# Patient Record
Sex: MALE | Race: WHITE | HISPANIC OR LATINO | Employment: FULL TIME | URBAN - METROPOLITAN AREA
[De-identification: names, ages, dates, MRNs, and addresses within clinical notes are randomized per-mention and may not be internally consistent; named-entity substitution may affect disease eponyms.]

---

## 2019-02-08 ENCOUNTER — OFFICE VISIT (OUTPATIENT)
Dept: FAMILY MEDICINE CLINIC | Facility: CLINIC | Age: 44
End: 2019-02-08

## 2019-02-08 VITALS
SYSTOLIC BLOOD PRESSURE: 108 MMHG | HEART RATE: 55 BPM | WEIGHT: 195 LBS | TEMPERATURE: 98.2 F | DIASTOLIC BLOOD PRESSURE: 72 MMHG | HEIGHT: 68 IN | OXYGEN SATURATION: 98 % | BODY MASS INDEX: 29.55 KG/M2

## 2019-02-08 DIAGNOSIS — E78.49 OTHER HYPERLIPIDEMIA: ICD-10-CM

## 2019-02-08 DIAGNOSIS — Z13.6 SCREENING FOR CARDIOVASCULAR CONDITION: ICD-10-CM

## 2019-02-08 DIAGNOSIS — F51.01 PRIMARY INSOMNIA: ICD-10-CM

## 2019-02-08 DIAGNOSIS — Z00.00 HEALTH MAINTENANCE EXAMINATION: Primary | ICD-10-CM

## 2019-02-08 DIAGNOSIS — E55.9 HYPOVITAMINOSIS D: ICD-10-CM

## 2019-02-08 PROBLEM — L71.9 ROSACEA: Status: ACTIVE | Noted: 2019-02-08

## 2019-02-08 PROCEDURE — 99396 PREV VISIT EST AGE 40-64: CPT | Performed by: FAMILY MEDICINE

## 2019-02-08 PROCEDURE — 99214 OFFICE O/P EST MOD 30 MIN: CPT | Performed by: FAMILY MEDICINE

## 2019-02-08 RX ORDER — ITRACONAZOLE 100 MG/1
200 CAPSULE ORAL WEEKLY
COMMUNITY
End: 2020-11-16

## 2019-02-08 RX ORDER — DOXEPIN HYDROCHLORIDE 6 MG/1
1 TABLET ORAL NIGHTLY
Qty: 8 TABLET | Refills: 0 | COMMUNITY
Start: 2019-02-08 | End: 2019-02-18 | Stop reason: SDUPTHER

## 2019-02-08 NOTE — PROGRESS NOTES
Everton Barros is a 43 y.o. male.     Chief Complaint   Patient presents with   • Annual Exam     physical exam complain about stress lately    • Establish Care     new pt establishing today with dr gillespie       HPI     Pt is a pleasant 43 y.o. YO male here for Annual wellness and Insomnia.  New patient to me and this office.  PMH includes GERD.    Using tacrolimus on face for rosacea     Health Maintenance   Topic Date Due   • ANNUAL PHYSICAL  06/22/1979   • LIPID PANEL  02/08/2019   • TDAP/TD VACCINES (1 - Tdap) 02/09/2020 (Originally 6/22/1995)   • INFLUENZA VACCINE  Completed     Diet: eating healthier, gained 10 lbs in the last vacation and working to loose it.   Exercising twice a week.     Insomnia, worsening.  He does have some issues with jet lag with his work.  But overall he is having issues with early wakening.  He is having more anxiety with work, increased stress load, he often falls asleep easily but then wakes up early and has difficulty falling back asleep.  No gasping for breath or apneic events.  When he does sleep an entire night he does feel like he gets restorative sleep.  No history of obstructive sleep apnea.    The following portions of the patient's history were reviewed and updated as appropriate: allergies, current medications, past family history, past medical history, past social history, past surgical history and problem list.    Review of Systems   Constitutional: Negative.    HENT: Negative.    Eyes: Negative.    Respiratory: Negative.    Cardiovascular: Negative.  Negative for chest pain, palpitations and leg swelling.   Gastrointestinal: Negative.    Endocrine: Negative.    Genitourinary: Negative.    Musculoskeletal: Negative for arthralgias (knees).   Skin: Positive for rash (face).   Allergic/Immunologic: Negative.    Neurological: Negative.  Negative for dizziness, tremors, seizures, syncope, facial asymmetry, speech difficulty, weakness, light-headedness, numbness  and headaches.   Psychiatric/Behavioral: Positive for sleep disturbance.        Stress       Objective  Vitals:    02/08/19 1030   BP: 108/72   Pulse: 55   Temp: 98.2 °F (36.8 °C)   SpO2: 98%        Physical Exam   Constitutional: He is oriented to person, place, and time. He appears well-developed and well-nourished. No distress.   HENT:   Head: Normocephalic.   Nose: Nose normal.   Eyes: EOM are normal.   Neck: Normal range of motion. No thyromegaly present.   Cardiovascular: Normal rate, regular rhythm, normal heart sounds and intact distal pulses.   No murmur heard.  Pulmonary/Chest: Effort normal and breath sounds normal. No respiratory distress.   Musculoskeletal: Normal range of motion.   Neurological: He is alert and oriented to person, place, and time.   Skin: Skin is warm and dry. No rash noted.   Psychiatric: He has a normal mood and affect. His behavior is normal. Judgment and thought content normal.   Nursing note and vitals reviewed.        Current Outpatient Medications:   •  itraconazole (SPORANOX) 100 MG capsule, Take 200 mg by mouth 1 (One) Time Per Week., Disp: , Rfl:   •  Doxepin HCl (SILENOR) 6 MG tablet, Take 1 tablet by mouth Every Night., Disp: 8 tablet, Rfl: 0    Procedures    Lab Results (most recent)     None              FelixConde was seen today for annual exam and establish care.    Diagnoses and all orders for this visit:    Health maintenance examination    Other hyperlipidemia  -     Comprehensive Metabolic Panel  -     Lipid Panel    Screening for cardiovascular condition  -     Comprehensive Metabolic Panel    Hypovitaminosis D  -     Vitamin D 25 Hydroxy    Primary insomnia  -     Doxepin HCl (SILENOR) 6 MG tablet; Take 1 tablet by mouth Every Night.      Mary 43-year-old male here as a new patient.  Annual wellness visit today.  Continue with diet and exercise, immunizations are up-to-date.  He immig[place] to [place] ago from [place], tetanus is up-to-date, unsure of the  exact year.  Labs as above.     Insomnia that is worsening.  Issues with early waking, sample for silenor are given.  If it does seem to help okay to call for long term prescription.    Return if symptoms worsen or fail to improve.      Julissa Buck MD

## 2019-02-16 LAB
25(OH)D3+25(OH)D2 SERPL-MCNC: 15.7 NG/ML (ref 30–100)
ALBUMIN SERPL-MCNC: 4.7 G/DL (ref 3.5–5.5)
ALBUMIN/GLOB SERPL: 1.7 {RATIO} (ref 1.2–2.2)
ALP SERPL-CCNC: 86 IU/L (ref 39–117)
ALT SERPL-CCNC: 29 IU/L (ref 0–44)
AST SERPL-CCNC: 24 IU/L (ref 0–40)
BILIRUB SERPL-MCNC: 0.6 MG/DL (ref 0–1.2)
BUN SERPL-MCNC: 14 MG/DL (ref 6–24)
BUN/CREAT SERPL: 18 (ref 9–20)
CALCIUM SERPL-MCNC: 9.4 MG/DL (ref 8.7–10.2)
CHLORIDE SERPL-SCNC: 106 MMOL/L (ref 96–106)
CHOLEST SERPL-MCNC: 161 MG/DL (ref 100–199)
CO2 SERPL-SCNC: 21 MMOL/L (ref 20–29)
CREAT SERPL-MCNC: 0.8 MG/DL (ref 0.76–1.27)
GLOBULIN SER CALC-MCNC: 2.7 G/DL (ref 1.5–4.5)
GLUCOSE SERPL-MCNC: 100 MG/DL (ref 65–99)
HDLC SERPL-MCNC: 37 MG/DL
LDLC SERPL CALC-MCNC: 85 MG/DL (ref 0–99)
POTASSIUM SERPL-SCNC: 4.1 MMOL/L (ref 3.5–5.2)
PROT SERPL-MCNC: 7.4 G/DL (ref 6–8.5)
SODIUM SERPL-SCNC: 142 MMOL/L (ref 134–144)
TRIGL SERPL-MCNC: 194 MG/DL (ref 0–149)
VLDLC SERPL CALC-MCNC: 39 MG/DL (ref 5–40)

## 2019-02-18 DIAGNOSIS — F51.01 PRIMARY INSOMNIA: ICD-10-CM

## 2019-02-18 RX ORDER — DOXEPIN HYDROCHLORIDE 6 MG/1
1 TABLET ORAL NIGHTLY
Qty: 30 TABLET | Refills: 11 | Status: SHIPPED | OUTPATIENT
Start: 2019-02-18 | End: 2020-11-16

## 2019-02-18 NOTE — PROGRESS NOTES
Please call patient with results. Kidney and liver functions look normal. Diabetes screen is negative but glucose is slightly elevated. Cholesterol is slightly elevated but with other risk factors only a 1.1% ASCVD risk and would not recommend Statin at this time - diet and exercise.  Vit D is low, start Vit D3 6000 units daily.  We can repeat the vit D in 3 months.     Thank You,    Julissa Buck M.D.

## 2019-02-20 ENCOUNTER — TELEPHONE (OUTPATIENT)
Dept: FAMILY MEDICINE CLINIC | Facility: CLINIC | Age: 44
End: 2019-02-20

## 2020-11-16 ENCOUNTER — OFFICE VISIT (OUTPATIENT)
Dept: FAMILY MEDICINE CLINIC | Facility: CLINIC | Age: 45
End: 2020-11-16

## 2020-11-16 VITALS
SYSTOLIC BLOOD PRESSURE: 120 MMHG | HEIGHT: 69 IN | WEIGHT: 194 LBS | BODY MASS INDEX: 28.73 KG/M2 | OXYGEN SATURATION: 99 % | HEART RATE: 60 BPM | DIASTOLIC BLOOD PRESSURE: 84 MMHG | TEMPERATURE: 97.8 F

## 2020-11-16 DIAGNOSIS — I95.1 ORTHOSTATIC HYPOTENSION: ICD-10-CM

## 2020-11-16 DIAGNOSIS — G44.209 TENSION HEADACHE: Primary | ICD-10-CM

## 2020-11-16 DIAGNOSIS — G44.209 TENSION HEADACHE: ICD-10-CM

## 2020-11-16 PROCEDURE — 99213 OFFICE O/P EST LOW 20 MIN: CPT | Performed by: NURSE PRACTITIONER

## 2020-11-16 RX ORDER — TIZANIDINE HYDROCHLORIDE 4 MG/1
4 CAPSULE, GELATIN COATED ORAL 3 TIMES DAILY
Qty: 10 CAPSULE | Refills: 0 | Status: SHIPPED | OUTPATIENT
Start: 2020-11-16 | End: 2020-11-16 | Stop reason: SDUPTHER

## 2020-11-16 NOTE — PROGRESS NOTES
"Ruben Barros is a 45 y.o. male.   Chief Complaint   Patient presents with   • Dizziness     Since yesterday patient has had a headache and dizziness he is also having neck and upper back pain  ( wore mask and goggles)        History of Present Illness   Patient of Dr. Buck's presenting to me for the first time today with concerns regarding neck pain and headache that started yesterday morning when he woke up.  Patient is unsure if he slept funny but he has consistently been having neck pain on a pain scale of 4 and headaches.  Patient stated he took aspirin once and it did help his headache but it did not do anything for his neck pain.  He does have full range of motion of his neck no numbness or tingling in his extremities and no stiffness reported.  He does not have any fever shortness of breath or cough.    He is also complaining of getting dizzy occasionally this typically only happens when he is bending forward and then comes back up he will get dizzy it only last for a few seconds.  It does not happen when he looks up or looks over either shoulder.    The following portions of the patient's history were reviewed and updated as appropriate: allergies, current medications, past social history and problem list.    Review of Systems   Neurological: Positive for dizziness and headaches.   All other systems reviewed and are negative.      Objective   /84   Pulse 60   Temp 97.8 °F (36.6 °C)   Ht 174 cm (68.5\")   Wt 88 kg (194 lb)   SpO2 99%   BMI 29.07 kg/m²   Physical Exam  Vitals signs reviewed.   Constitutional:       General: He is not in acute distress.     Appearance: He is well-developed.   HENT:      Head: Normocephalic.   Cardiovascular:      Rate and Rhythm: Normal rate and regular rhythm.      Heart sounds: Normal heart sounds.   Pulmonary:      Effort: Pulmonary effort is normal.      Breath sounds: Normal breath sounds.   Neurological:      Mental Status: He is alert " and oriented to person, place, and time.      Gait: Gait normal.   Psychiatric:         Behavior: Behavior normal.         Thought Content: Thought content normal.         Judgment: Judgment normal.         Assessment/Plan      Diagnosis Plan   1. Tension headache  TiZANidine (Zanaflex) 4 MG capsule   2. Orthostatic hypotension       Discussed orthostatic hypotension with him at length told him to increase his water intake and move slowly if the issue continued got worse or more often he does need to follow-up with Dr. Buck regarding this.    Giving him Zanaflex to use for his headache and neck pain did talk with him at length regarding this medication and not to use it and then drive patient verbalized understanding.  If neck pain continues follow-up with Dr. Buck.    Mask and googles worn    Dre Reyna, ASHLYN  11/16/2020

## 2020-11-17 NOTE — TELEPHONE ENCOUNTER
Patient called and he called the pharmacy around 3:00 pm today and they told him they didn't have his script. I verified the pharmacy and its the one on file. Please advise.     TiZANidine (Zanaflex) 4 MG capsule    [id number] - [facility] [address] [address] AT [facility] - [phone] [facility] [phone]   [phone]

## 2020-11-18 RX ORDER — TIZANIDINE HYDROCHLORIDE 4 MG/1
4 CAPSULE, GELATIN COATED ORAL 3 TIMES DAILY
Qty: 10 CAPSULE | Refills: 0 | Status: SHIPPED | OUTPATIENT
Start: 2020-11-18

## 2021-04-06 ENCOUNTER — BULK ORDERING (OUTPATIENT)
Dept: CASE MANAGEMENT | Facility: OTHER | Age: 46
End: 2021-04-06

## 2021-04-06 DIAGNOSIS — Z23 IMMUNIZATION DUE: ICD-10-CM

## 2021-06-11 NOTE — PROGRESS NOTES
New left Shoulder      Patient: Everton Barros        YOB: 1975    Medical Record Number: [id number]        Chief Complaints: Left shoulder pain       History of Present Illness: This is a 45-year-old male who presents complaining of left shoulder pain is been ongoing for couple of months he is right-hand dominant he does notice a decrease in range of motion no history injury he does come with an MRI as well.  He has not had this in the past symptoms are moderate intermittent 5 out of 10 crushing aching worse with certain positions and activity somewhat better with rest past medical history is marked for dermatitis and hyperlipidemia      Allergies:   Allergies   Allergen Reactions   • Ketorolac GI Intolerance       Medications:   Home Medications:  Current Outpatient Medications on File Prior to Visit   Medication Sig   • TiZANidine (Zanaflex) 4 MG capsule Take 1 capsule by mouth 3 (Three) Times a Day.     No current facility-administered medications on file prior to visit.     Current Medications:  Scheduled Meds:  Continuous Infusions:No current facility-administered medications for this visit.    PRN Meds:.    Past Medical History:   Diagnosis Date   • Dermatitis    • Hyperlipidemia       No past surgical history on file.     Social History     Occupational History   • Not on file   Tobacco Use   • Smoking status: Never Smoker   • Smokeless tobacco: Never Used   Substance and Sexual Activity   • Alcohol use: Yes     Comment: occ   • Drug use: No   • Sexual activity: Yes     Partners: Female     Comment:        Social History     Social History Narrative   • Not on file        Family History   Problem Relation Age of Onset   • No Known Problems Mother    • Hypertension Father         Exercise    • Diabetes Father    • No Known Problems Sister    • No Known Problems Brother    • No Known Problems Sister    • Cancer Paternal Grandmother    • Heart attack Neg Hx    • Stroke Neg Hx   "            Review of Systems: 14 point review of systems are remarkable for shoulder pain only the remainder negative per the patient    Review of Systems      Physical Exam: 45 y.o. male  General Appearance:    Alert, cooperative, in no acute distress                   Vitals:    06/15/21 1419   Temp: 97.8 °F (36.6 °C)   Weight: 90.7 kg (200 lb)   Height: 167.6 cm (66\")   PainSc:   5      Patient is alert and read ×3 no acute distress appears her above-listed at height weight and age.  Affect is normal respiratory rate is normal unlabored. Heart rate regular rate rhythm, sclera, dentition and hearing are normal for the purpose of this exam.    Ortho Exam Physical exam of the left shoulder reveals no overlying skin changes no lymphedema no lymphadenopathy.  Patient has active flexion 150 with mild symptoms passively I can get them to about 160 abduction is similar external rotation is to 0 and internal rotation to their buttocks with  symptoms.  Patient has good rotator cuff strength 4+ over 5 with isometric strength testing with pain.  Patient has a positive impingement and a positive Guevara sign.  Patient has good cervical range of motion which is full and asymptomatic no radicular symptoms.  Patient has a normal elbow exam.  Good distal pulses are present      Large Joint Arthrocentesis: L glenohumeral  Date/Time: 6/15/2021 2:44 PM  Consent given by: patient  Site marked: site marked  Timeout: Immediately prior to procedure a time out was called to verify the correct patient, procedure, equipment, support staff and site/side marked as required   Supporting Documentation  Indications: pain   Procedure Details  Location: shoulder - L glenohumeral  Preparation: Patient was prepped and draped in the usual sterile fashion  Needle size: 22 G  Approach: posterior  Medications administered: 80 mg methylPREDNISolone acetate 80 MG/ML; 4 mL lidocaine PF 2% 2 %  Patient tolerance: patient tolerated the procedure well with " no immediate complications                Radiology:   AP, Scapular Y and Axillary Lateral of the left shoulder were ordered/reviewed to evauate shoulder pain.  Imaging Results (Most Recent)     None        Assessment/Plan: Left shoulder pain I think this is straightforward adhesive capsulitis which we discussed in detail plan is to proceed with glenohumeral injection I will also start on his meloxicam with strict precautions and have him see physical therapy.  I will see him back in 4 weeks we talked about options including manipulation in the future if needed  Cortisone Injection. See procedure note.  Cortisone Injection for DIAGNOSTIC and THERAPUTIC purposes.

## 2021-06-15 ENCOUNTER — OFFICE VISIT (OUTPATIENT)
Dept: ORTHOPEDIC SURGERY | Facility: CLINIC | Age: 46
End: 2021-06-15

## 2021-06-15 VITALS — BODY MASS INDEX: 32.14 KG/M2 | HEIGHT: 66 IN | WEIGHT: 200 LBS | TEMPERATURE: 97.8 F

## 2021-06-15 DIAGNOSIS — M75.02 ADHESIVE CAPSULITIS OF LEFT SHOULDER: Primary | ICD-10-CM

## 2021-06-15 PROCEDURE — 20610 DRAIN/INJ JOINT/BURSA W/O US: CPT | Performed by: ORTHOPAEDIC SURGERY

## 2021-06-15 PROCEDURE — 99204 OFFICE O/P NEW MOD 45 MIN: CPT | Performed by: ORTHOPAEDIC SURGERY

## 2021-06-15 RX ORDER — MELOXICAM 15 MG/1
TABLET ORAL
Qty: 30 TABLET | Refills: 0 | Status: SHIPPED | OUTPATIENT
Start: 2021-06-15 | End: 2021-07-12

## 2021-06-15 RX ADMIN — METHYLPREDNISOLONE ACETATE 80 MG: 80 INJECTION, SUSPENSION INTRA-ARTICULAR; INTRALESIONAL; INTRAMUSCULAR; SOFT TISSUE at 14:44

## 2021-06-15 RX ADMIN — LIDOCAINE HYDROCHLORIDE 4 ML: 20 INJECTION, SOLUTION EPIDURAL; INFILTRATION; INTRACAUDAL; PERINEURAL at 14:44

## 2021-06-18 ENCOUNTER — TREATMENT (OUTPATIENT)
Dept: PHYSICAL THERAPY | Facility: CLINIC | Age: 46
End: 2021-06-18

## 2021-06-18 DIAGNOSIS — M25.512 ACUTE PAIN OF LEFT SHOULDER: ICD-10-CM

## 2021-06-18 DIAGNOSIS — M75.02 ADHESIVE CAPSULITIS OF LEFT SHOULDER: Primary | ICD-10-CM

## 2021-06-18 PROCEDURE — 97110 THERAPEUTIC EXERCISES: CPT | Performed by: PHYSICAL THERAPIST

## 2021-06-18 PROCEDURE — 97140 MANUAL THERAPY 1/> REGIONS: CPT | Performed by: PHYSICAL THERAPIST

## 2021-06-18 PROCEDURE — 97161 PT EVAL LOW COMPLEX 20 MIN: CPT | Performed by: PHYSICAL THERAPIST

## 2021-06-18 NOTE — PROGRESS NOTES
Physical Therapy Initial Evaluation and Plan of Care        Patient: Everton Barros   : 1975  Diagnosis/ICD-10 Code:  Adhesive capsulitis of left shoulder [M75.02]  Referring practitioner: Chari Payton MD  Date of Initial Visit: 2021  Today's Date: 2021  Patient seen for 1 sessions           Subjective Questionnaire: QuickDASH: 34.09      Subjective Evaluation    History of Present Illness  Mechanism of injury: Emeka is a 45 year old male who presents with L shoulder pain. He has been diagnosed with adhesive capsulitis. He had an injection with Dr Payton that has helped with pain, but it is still stiff. Insidious onset. He was also RX meloxicam that has helped. He usually sleeps on his L side but that has been too painful for him recently. Pain is mostly in the front of his arm, from point of shoulder to elbow. He describes it as achy, like a bruise. He has difficulty reaching behind his back.    Pain  Current pain ratin  At worst pain ratin  Quality: dull ache  Aggravating factors: overhead activity and lifting  Progression: improved    Hand dominance: right    Diagnostic Tests  X-ray: normal  MRI studies: normal    Treatments  Current treatment: physical therapy  Patient Goals  Patient goals for therapy: decreased pain, independence with ADLs/IADLs and increased motion             Objective          Palpation   Left   Hypertonic in the triceps.   Tenderness of the triceps.     Tenderness     Left Shoulder   Tenderness in the AC joint and biceps tendon (proximal).     Active Range of Motion   Left Shoulder   Flexion: 134 degrees with pain  Abduction: 123 degrees with pain  External rotation 45°: 40 degrees   Internal rotation 45°: 80 degrees     Right Shoulder   Flexion: 161 degrees   Abduction: 180 degrees   External rotation 45°: 85 degrees   Internal rotation 45°: 90 degrees     Joint Play   Left Shoulder  Hypomobile in the anterior capsule, posterior capsule and inferior  capsule.    Strength/Myotome Testing     Left Shoulder     Planes of Motion   Flexion: 4   Abduction: 4+   External rotation at 0°: 4+   Internal rotation at 0°: 4+     Right Shoulder     Planes of Motion   Flexion: 4+   Abduction: 4+   External rotation at 0°: 4+   Internal rotation at 0°: 4+           Assessment & Plan     Assessment  Impairments: abnormal muscle tone, abnormal or restricted ROM, activity intolerance, impaired physical strength, lacks appropriate home exercise program and pain with function  Assessment details: Pt presents with the following impairments: L shoulder ROM restrictions, pain with movement and ADLs, increased pain and self-reported dysfunction. Pt's signs and symptoms are consistent with referring diagnosis. Pt would benefit from additional skilled therapy to address the aforementioned problems and return to prior level of functioning with minimal functional limitations.  Prognosis: good  Functional Limitations: carrying objects, lifting, pulling, pushing, reaching behind back, reaching overhead and unable to perform repetitive tasks  Goals  Plan Goals: Short Term Goals (achieve in 4 weeks):  1. Pt will decrease current pain to 0/10.  2. Pt will decrease QuickDASH score to 30% or less.  3. Pt will increase AROM flexion of L shoulder to 145 degrees.  4. Pt will improve AROM L shoulder ER to 60 degrees.  Long Term Goals (6-8 weeks):  5. Pt will improve L shoulder flexion AROM to 160 degrees.  6. Pt will exhibit decreased tenderness to palpation of L AC joint.  7. Pt will sleep on L side with no increase in shoulder pain.    Plan  Therapy options: will be seen for skilled physical therapy services  Planned modality interventions: cryotherapy, TENS, thermotherapy (hydrocollator packs) and ultrasound  Planned therapy interventions: ADL retraining, flexibility, functional ROM exercises, home exercise program, IADL retraining, joint mobilization, therapeutic activities, stretching,  strengthening, soft tissue mobilization, postural training and manual therapy  Frequency: 2x week  Duration in weeks: 8  Treatment plan discussed with: patient        Visit Diagnoses:    ICD-10-CM ICD-9-CM   1. Adhesive capsulitis of left shoulder  M75.02 726.0   2. Acute pain of left shoulder  M25.512 719.41       Timed:  Manual Therapy:    10     mins  28260;  Therapeutic Exercise:    10     mins  89069;     Neuromuscular Raul:        mins  96483;    Therapeutic Activity:          mins  25661;     Gait Training:           mins  78138;     Ultrasound:          mins  71790;    Electrical Stimulation:         mins  85436 ( );    Untimed:  Electrical Stimulation:         mins  12922 ( );  Mechanical Traction:         mins  43903;     Timed Treatment:   20   mins   Total Treatment:     50   mins    PT SIGNATURE: Qamar Villarreal PT, DPT, [facility]  DATE TREATMENT INITIATED: 6/18/2021      Initial Certification  Certification Period: 9/16/2021  I certify that the therapy services are furnished while this patient is under my care.  The services outlined above are required by this patient, and will be reviewed every 90 days.     PHYSICIAN: Chari Payton MD      DATE:     Please sign and return via fax to [phone].. Thank you, [facility].

## 2021-06-21 RX ORDER — LIDOCAINE HYDROCHLORIDE 20 MG/ML
4 INJECTION, SOLUTION EPIDURAL; INFILTRATION; INTRACAUDAL; PERINEURAL
Status: COMPLETED | OUTPATIENT
Start: 2021-06-15 | End: 2021-06-15

## 2021-06-21 RX ORDER — METHYLPREDNISOLONE ACETATE 80 MG/ML
80 INJECTION, SUSPENSION INTRA-ARTICULAR; INTRALESIONAL; INTRAMUSCULAR; SOFT TISSUE
Status: COMPLETED | OUTPATIENT
Start: 2021-06-15 | End: 2021-06-15

## 2021-07-12 RX ORDER — MELOXICAM 15 MG/1
TABLET ORAL
Qty: 30 TABLET | Refills: 0 | Status: SHIPPED | OUTPATIENT
Start: 2021-07-12

## 2021-07-13 ENCOUNTER — TREATMENT (OUTPATIENT)
Dept: PHYSICAL THERAPY | Facility: CLINIC | Age: 46
End: 2021-07-13

## 2021-07-13 DIAGNOSIS — M75.02 ADHESIVE CAPSULITIS OF LEFT SHOULDER: Primary | ICD-10-CM

## 2021-07-13 DIAGNOSIS — M25.512 ACUTE PAIN OF LEFT SHOULDER: ICD-10-CM

## 2021-07-13 PROCEDURE — 97110 THERAPEUTIC EXERCISES: CPT | Performed by: PHYSICAL THERAPIST

## 2021-07-13 PROCEDURE — 97530 THERAPEUTIC ACTIVITIES: CPT | Performed by: PHYSICAL THERAPIST

## 2021-07-13 PROCEDURE — 97140 MANUAL THERAPY 1/> REGIONS: CPT | Performed by: PHYSICAL THERAPIST

## 2021-07-13 NOTE — PROGRESS NOTES
Physical Therapy Daily Progress Note    Patient: Everton Perezcuatecatparveen   : 1975  Diagnosis/ICD-10 Code:  Adhesive capsulitis of left shoulder [M75.02]  Referring practitioner: Chari Payton MD  Date of Initial Visit: Type: THERAPY  Noted: 2021  Today's Date: 2021  Patient seen for 2 sessions         FeljayConde Zelocuatecatl reports: shoulder is feeling better, still having trouble with behind the back stretch. Started swimming 3x a week since last PT session and he feels like it has helped a lot. Not having nearly as many limitations as first session.     Subjective     Objective   Access Code: [email or web address]  URL: [email or web address]  Date: 2021  Prepared by: Callie Finnegan    Exercises  Corner Pec Major Stretch - 1 x daily - 7 x weekly - 4 reps - 20 hold  Prone Shoulder Extension - Single Arm - 1 x daily - 7 x weekly - 2 sets - 10 reps  Prone Shoulder Horizontal Abduction - 1 x daily - 7 x weekly - 2 sets - 10 reps  Prone Single Arm Shoulder Y - 1 x daily - 7 x weekly - 2 sets - 10 reps    See Exercise, Manual, and Modality Logs for complete treatment.       Assessment/Plan  Subjectively, pt reports no increase of pain or discomfort with interventions performed today. Performed well with continued mobility interventions and added prone scapular strengthening. Continues to demonstrate improved ROM in all planes, although is still limited in rotational planes. Continues to benefit from verbal/tactile cues to ensure proper form and technique for exercise performance.     Progress per Plan of Care           Manual Therapy:    8     mins  04320;  Therapeutic Exercise:    20     mins  58484;     Neuromuscular Raul:        mins  82674;    Therapeutic Activity:     10     mins  57554;     Gait Training:           mins  23412;     Ultrasound:          mins  30804;    Electrical Stimulation:         mins  74167 ( );  Dry Needling          mins self-pay    Timed Treatment:   38    mins   Total Treatment:     38   mins    Callie Finnegan PTA  Physical Therapist Assistant [id number]

## 2021-07-15 ENCOUNTER — TREATMENT (OUTPATIENT)
Dept: PHYSICAL THERAPY | Facility: CLINIC | Age: 46
End: 2021-07-15

## 2021-07-15 DIAGNOSIS — M75.02 ADHESIVE CAPSULITIS OF LEFT SHOULDER: Primary | ICD-10-CM

## 2021-07-15 DIAGNOSIS — M25.512 ACUTE PAIN OF LEFT SHOULDER: ICD-10-CM

## 2021-07-15 PROCEDURE — 97110 THERAPEUTIC EXERCISES: CPT | Performed by: PHYSICAL THERAPIST

## 2021-07-15 PROCEDURE — 97140 MANUAL THERAPY 1/> REGIONS: CPT | Performed by: PHYSICAL THERAPIST

## 2021-07-15 NOTE — PROGRESS NOTES
Re-Assessment / Re-Certification      Patient: Everton Barros   : 1975  Diagnosis/ICD-10 Code:  Adhesive capsulitis of left shoulder [M75.02]  Referring practitioner: Chari Payton MD  Date of Initial Visit: Type: THERAPY  Noted: 2021  Today's Date: 7/15/2021  Patient seen for 3 sessions      Subjective:   Everton Barros reports: he has been compliant with his HEP and doing some swimming for exercise and his shoulder has been feeling a lot better. He feels like he's making good progress and thinks he may have his full range of motion back.  Subjective Questionnaire: QuickDASH: 6.82  Clinical Progress: improved  Home Program Compliance: Yes  Treatment has included: therapeutic exercise, neuromuscular re-education, manual therapy and therapeutic activity    Subjective Evaluation    Pain  Current pain ratin  At worst pain ratin         Objective          Active Range of Motion   Left Shoulder   External rotation BTH: C7 with pain  Internal rotation BTB: T12 with pain    Right Shoulder   External rotation BTH: T5   Internal rotation BTB: T7       Active Range of Motion   Left Shoulder   Flexion: 150 degrees   Abduction: 161 degrees   External rotation 45°: 40 degrees   Internal rotation 45°: 90 degrees     Right Shoulder   Flexion: 161 degrees   Abduction: 180 degrees   External rotation 45°: 85 degrees   Internal rotation 45°: 90 degrees     Joint Play   Left Shoulder  Hypomobile in the anterior capsule, posterior capsule and inferior capsule.    Strength/Myotome Testing     Left Shoulder     Planes of Motion   Flexion: 4+   Abduction: 4+   External rotation at 0°: 4+   Internal rotation at 0°: 4+     Right Shoulder     Planes of Motion   Flexion: 4+   Abduction: 4+   External rotation at 0°: 4+   Internal rotation at 0°: 4+             Assessment & Plan     Assessment  Impairments: abnormal muscle tone, abnormal or restricted ROM, activity intolerance, impaired physical  strength, lacks appropriate home exercise program and pain with function  Assessment details: Emeka has met 3/4 short term goals and 2/3 long term goals. His pain and flexion ROM are much improved, but he continues to exhibit ROM asymmetries L>R in his internal and external rotation. He would benefit from additional skilled therapy to address his ROM deficits and allow him to return to full function with no limitations.  Prognosis: good  Functional Limitations: carrying objects, lifting, pulling, pushing, reaching behind back, reaching overhead and unable to perform repetitive tasks  Goals  Plan Goals: Short Term Goals (achieve in 4 weeks):  1. Pt will decrease current pain to 0/10. (met 7/16/2021)  2. Pt will decrease QuickDASH score to 30% or less.(met 7/16/2021)  3. Pt will increase AROM flexion of L shoulder to 145 degrees.(met 7/16/2021)  4. Pt will improve AROM L shoulder ER to 60 degrees. (not met)  Long Term Goals (6-8 weeks):  5. Pt will improve L shoulder flexion AROM to 160 degrees. (not met)  6. Pt will exhibit decreased tenderness to palpation of L AC joint.(met 7/16/2021)  7. Pt will sleep on L side with no increase in shoulder pain.(met 7/16/2021)      Plan  Therapy options: will be seen for skilled physical therapy services  Planned modality interventions: cryotherapy, TENS, thermotherapy (hydrocollator packs) and ultrasound  Planned therapy interventions: ADL retraining, flexibility, functional ROM exercises, home exercise program, IADL retraining, joint mobilization, therapeutic activities, stretching, strengthening, soft tissue mobilization, postural training and manual therapy  Frequency: 2x week  Duration in weeks: 8  Treatment plan discussed with: patient        Visit Diagnoses:    ICD-10-CM ICD-9-CM   1. Adhesive capsulitis of left shoulder  M75.02 726.0   2. Acute pain of left shoulder  M25.512 719.41       Progress toward previous goals: Partially Met          Recommendations: Continue as  planned  Timeframe: 1 month  Prognosis to achieve goals: good    PT Signature: Qamar Villarreal PT, DPT, OCS      Based upon review of the patient's progress and continued therapy plan, it is my medical opinion that FelixConde Zelocuatecatl should continue physical therapy treatment at [facility]  [address] [facility], [address] [address]  [phone].    Signature: __________________________________  Chari Payton MD    Timed:  Manual Therapy:    10     mins  19845;  Therapeutic Exercise:    10     mins  72285;     Neuromuscular Raul:        mins  64467;    Therapeutic Activity:          mins  01719;     Gait Training:           mins  25380;     Ultrasound:          mins  97934;    Electrical Stimulation:         mins  16266 ( );    Untimed:  Electrical Stimulation:         mins  26229 ( );  Mechanical Traction:         mins  63483;     Timed Treatment:   20   mins   Total Treatment:     30   mins

## 2021-07-19 ENCOUNTER — TREATMENT (OUTPATIENT)
Dept: PHYSICAL THERAPY | Facility: CLINIC | Age: 46
End: 2021-07-19

## 2021-07-19 DIAGNOSIS — M75.02 ADHESIVE CAPSULITIS OF LEFT SHOULDER: ICD-10-CM

## 2021-07-19 DIAGNOSIS — M25.512 ACUTE PAIN OF LEFT SHOULDER: Primary | ICD-10-CM

## 2021-07-19 PROCEDURE — 97530 THERAPEUTIC ACTIVITIES: CPT | Performed by: PHYSICAL THERAPIST

## 2021-07-19 PROCEDURE — 97140 MANUAL THERAPY 1/> REGIONS: CPT | Performed by: PHYSICAL THERAPIST

## 2021-07-19 NOTE — PROGRESS NOTES
Patient: Everton Barros  YOB: 1975  Date of Service: 7/19/2021    Chief Complaints: Left shoulder pain    Subjective:    History of Present Illness: Pt is seen in the office today with complaints of left shoulder pain he was felt to have adhesive capsulitis we injected he start physical therapy he states he is doing great he has just minimal symptoms and restriction of motion remaining overall doing well has some further physical therapy visits remaining.          Allergies:   Allergies   Allergen Reactions   • Ketorolac GI Intolerance       Medications:   Home Medications:  Current Outpatient Medications on File Prior to Visit   Medication Sig   • meloxicam (MOBIC) 15 MG tablet TAKE 1 TABLET BY MOUTH DAILY WITH FOOD   • TiZANidine (Zanaflex) 4 MG capsule Take 1 capsule by mouth 3 (Three) Times a Day.     No current facility-administered medications on file prior to visit.     Current Medications:  Scheduled Meds:  Continuous Infusions:No current facility-administered medications for this visit.    PRN Meds:.    I have reviewed the patient's medical history in detail and updated the computerized patient record.  Review and summarization of old records include:    Past Medical History:   Diagnosis Date   • Dermatitis    • Hyperlipidemia       No past surgical history on file.     Social History     Occupational History   • Not on file   Tobacco Use   • Smoking status: Never Smoker   • Smokeless tobacco: Never Used   Substance and Sexual Activity   • Alcohol use: Yes     Comment: occ   • Drug use: No   • Sexual activity: Yes     Partners: Female     Comment:        Social History     Social History Narrative   • Not on file        Family History   Problem Relation Age of Onset   • No Known Problems Mother    • Hypertension Father         Exercise    • Diabetes Father    • No Known Problems Sister    • No Known Problems Brother    • No Known Problems Sister    • Cancer Paternal  Grandmother    • Heart attack Neg Hx    • Stroke Neg Hx        ROS: 14 point review of systems was performed and was negative except for documented findings in HPI and today's encounter.     Allergies:   Allergies   Allergen Reactions   • Ketorolac GI Intolerance     Constitutional:  Denies fever, shaking or chills   Eyes:  Denies change in visual acuity   HENT:  Denies nasal congestion or sore throat   Respiratory:  Denies cough or shortness of breath   Cardiovascular:  Denies chest pain or severe LE edema   GI:  Denies abdominal pain, nausea, vomiting, bloody stools or diarrhea   Musculoskeletal:  Numbness, tingling, or loss of motor function only as noted above in history of present illness.  : Denies painful urination or hematuria  Integument:  Denies rash, lesion or ulceration   Neurologic:  Denies headache or focal weakness  Endocrine:  Denies lymphadenopathy  Psych:  Denies confusion or change in mental status   Hem:  Denies active bleeding      Physical Exam: 46 y.o. male  Wt Readings from Last 3 Encounters:   06/15/21 90.7 kg (200 lb)   11/16/20 88 kg (194 lb)   06/29/20 92 kg (202 lb 13.2 oz)       There is no height or weight on file to calculate BMI.  No height and weight on file for this encounter.  There were no vitals filed for this visit.  Vital signs reviewed.   General Appearance:    Alert, cooperative, in no acute distress                    Ortho exam    Physical exam of the left shoulder reveals no overlying skin changes no lymphedema no lymphadenopathy.  Patient has active flexion 175 with mild symptoms abduction is similar external rotation is to 50 and internal rotation to the mid lumbar spine with mild symptoms.  Patient has good rotator cuff strength 4+ over 5 with isometric strength testing with pain.  Patient has a positive impingement and a positive Guevara sign.  Patient has good cervical range of motion which is full and asymptomatic no radicular symptoms.  Patient has a normal elbow  exam.  Good distal pulses are presentPatient has pain with overhead activity and a positive Neer sign and a positive empty can sign  They have a positive drop arm any definitive painful arc           .time    Assessment: Left shoulder adhesive capsulitis which has significantly improved he will continue with physical therapy till he gets the last bit of his motion back and then he can progress to home program.  As long as he is doing well he can follow-up as needed    Plan:   Follow up as indicated.  Ice, elevate, and rest as needed.  Discussed conservative measures of pain control including ice, bracing.  Also talked about the importance of strengthening     Chari Payton M.D.

## 2021-07-19 NOTE — PROGRESS NOTES
Physical Therapy Daily Treatment Note      Patient: Everton Gamezlocuatecatl   : 1975  Referring practitioner: Chari Payton MD  Date of Initial Visit: Type: THERAPY  Noted: 2021  Today's Date: 2021  Patient seen for 4 sessions         FeljayConde Zelocuatecatl reports: no new complaints.      Subjective     Objective   See Exercise, Manual, and Modality Logs for complete treatment.       Assessment/Plan  Emeka's abduction and flexion ROM are near full range, but his internal and external rotation PROM continue to be limited to about 50-60% of normal. He responds well to glenohumeral joint mobilizations and PROM stretching.  Visit Diagnoses:    ICD-10-CM ICD-9-CM   1. Acute pain of left shoulder  M25.512 719.41   2. Adhesive capsulitis of left shoulder  M75.02 726.0       Progress per Plan of Care           Timed:  Manual Therapy:    10     mins  02231;  Therapeutic Exercise:         mins  86104;     Neuromuscular Raul:        mins  27952;    Therapeutic Activity:     25     mins  40962;     Gait Training:           mins  06195;     Ultrasound:          mins  89015;    Electrical Stimulation:         mins  05208 ( );    Untimed:  Electrical Stimulation:         mins  32557 ( );  Mechanical Traction:         mins  02606;     Timed Treatment:   35   mins   Total Treatment:     35   mins  Qamar Villarreal PT, DPT, OCS  Physical Therapist

## 2021-07-20 ENCOUNTER — DOCUMENTATION (OUTPATIENT)
Dept: ORTHOPEDIC SURGERY | Facility: CLINIC | Age: 46
End: 2021-07-20

## 2021-07-20 ENCOUNTER — OFFICE VISIT (OUTPATIENT)
Dept: ORTHOPEDIC SURGERY | Facility: CLINIC | Age: 46
End: 2021-07-20

## 2021-07-20 VITALS — TEMPERATURE: 97.5 F | BODY MASS INDEX: 32.14 KG/M2 | HEIGHT: 66 IN | WEIGHT: 200 LBS

## 2021-07-20 DIAGNOSIS — M75.02 ADHESIVE CAPSULITIS OF LEFT SHOULDER: Primary | ICD-10-CM

## 2021-07-20 PROCEDURE — 99212 OFFICE O/P EST SF 10 MIN: CPT | Performed by: ORTHOPAEDIC SURGERY

## 2021-07-21 ENCOUNTER — TREATMENT (OUTPATIENT)
Dept: PHYSICAL THERAPY | Facility: CLINIC | Age: 46
End: 2021-07-21

## 2021-07-21 DIAGNOSIS — M25.512 ACUTE PAIN OF LEFT SHOULDER: Primary | ICD-10-CM

## 2021-07-21 DIAGNOSIS — M75.02 ADHESIVE CAPSULITIS OF LEFT SHOULDER: ICD-10-CM

## 2021-07-21 PROCEDURE — 97530 THERAPEUTIC ACTIVITIES: CPT | Performed by: PHYSICAL THERAPIST

## 2021-07-21 PROCEDURE — 97110 THERAPEUTIC EXERCISES: CPT | Performed by: PHYSICAL THERAPIST

## 2021-07-21 PROCEDURE — 97140 MANUAL THERAPY 1/> REGIONS: CPT | Performed by: PHYSICAL THERAPIST

## 2021-07-21 NOTE — PROGRESS NOTES
Physical Therapy Daily Treatment Note      Patient: Everton Barros   : 1975  Referring practitioner: Chari Payton MD  Date of Initial Visit: Type: THERAPY  Noted: 2021  Today's Date: 2021  Patient seen for 5 sessions         Everton Barros reports: his MD appointment went well, she released him from her care but wants him to finish his therapy so he achieves full ER and IR ROM which he continues to lack.      Subjective     Objective   See Exercise, Manual, and Modality Logs for complete treatment.       Assessment/Plan  Emeka'lydia SANCHEZ ER improved from 55 degrees to 70 degrees with stretches and joint mobilizations today. He continues to report moderate pain at end range with PROM stretching.  Visit Diagnoses:    ICD-10-CM ICD-9-CM   1. Acute pain of left shoulder  M25.512 719.41   2. Adhesive capsulitis of left shoulder  M75.02 726.0       Progress per Plan of Care           Timed:  Manual Therapy:    15     mins  21000;  Therapeutic Exercise:    15     mins  51514;     Neuromuscular Raul:        mins  96231;    Therapeutic Activity:     15     mins  62460;     Gait Training:           mins  00610;     Ultrasound:          mins  86458;    Electrical Stimulation:         mins  03529 ( );    Untimed:  Electrical Stimulation:         mins  12579 ( );  Mechanical Traction:         mins  12628;     Timed Treatment:   45   mins   Total Treatment:     45   mins  Qamar Villarreal PT, DPT, OCS  Physical Therapist

## 2021-07-26 ENCOUNTER — TREATMENT (OUTPATIENT)
Dept: PHYSICAL THERAPY | Facility: CLINIC | Age: 46
End: 2021-07-26

## 2021-07-26 DIAGNOSIS — M25.512 ACUTE PAIN OF LEFT SHOULDER: Primary | ICD-10-CM

## 2021-07-26 DIAGNOSIS — M75.02 ADHESIVE CAPSULITIS OF LEFT SHOULDER: ICD-10-CM

## 2021-07-26 PROCEDURE — 97140 MANUAL THERAPY 1/> REGIONS: CPT | Performed by: PHYSICAL THERAPIST

## 2021-07-26 PROCEDURE — 97110 THERAPEUTIC EXERCISES: CPT | Performed by: PHYSICAL THERAPIST

## 2021-07-26 PROCEDURE — 97530 THERAPEUTIC ACTIVITIES: CPT | Performed by: PHYSICAL THERAPIST

## 2021-07-26 NOTE — PROGRESS NOTES
Physical Therapy Daily Treatment Note      Patient: Everton Perezcuatecatl   : 1975  Referring practitioner: Chari Payton MD  Date of Initial Visit: Type: THERAPY  Noted: 2021  Today's Date: 2021  Patient seen for 6 sessions         FelAlesia Perezcuatecatl reports: no new complaints, he's been working on his shoulder rotation at home with his HEP.      Subjective     Objective          Active Range of Motion   Left Shoulder   External rotation 90°: 75 degrees with pain    Passive Range of Motion   Left Shoulder   External rotation 90°: 85 degrees with pain      See Exercise, Manual, and Modality Logs for complete treatment.       Assessment/Plan  Emeka continues to exhibit AROM and PROM L shoulder ER limitations with pain at end range.  Visit Diagnoses:    ICD-10-CM ICD-9-CM   1. Acute pain of left shoulder  M25.512 719.41   2. Adhesive capsulitis of left shoulder  M75.02 726.0       Progress per Plan of Care           Timed:  Manual Therapy:    10     mins  41578;  Therapeutic Exercise:    10     mins  15683;     Neuromuscular Raul:    15    mins  95878;    Therapeutic Activity:          mins  03416;     Gait Training:           mins  88678;     Ultrasound:          mins  60771;    Electrical Stimulation:         mins  95882 ( );    Untimed:  Electrical Stimulation:         mins  04565 ( );  Mechanical Traction:         mins  82457;     Timed Treatment:   35   mins   Total Treatment:     35   mins  Qamar Villarreal PT, DPT, OCS  Physical Therapist

## 2021-07-28 ENCOUNTER — TREATMENT (OUTPATIENT)
Dept: PHYSICAL THERAPY | Facility: CLINIC | Age: 46
End: 2021-07-28

## 2021-07-28 DIAGNOSIS — M25.512 ACUTE PAIN OF LEFT SHOULDER: Primary | ICD-10-CM

## 2021-07-28 DIAGNOSIS — M75.02 ADHESIVE CAPSULITIS OF LEFT SHOULDER: ICD-10-CM

## 2021-07-28 PROCEDURE — 97530 THERAPEUTIC ACTIVITIES: CPT | Performed by: PHYSICAL THERAPIST

## 2021-07-28 PROCEDURE — 97110 THERAPEUTIC EXERCISES: CPT | Performed by: PHYSICAL THERAPIST

## 2021-07-28 PROCEDURE — 97140 MANUAL THERAPY 1/> REGIONS: CPT | Performed by: PHYSICAL THERAPIST

## 2021-07-28 NOTE — PROGRESS NOTES
Physical Therapy Daily Treatment Note      Patient: Everton Ramirezatecatl   : 1975  Referring practitioner: Chari Payton MD  Date of Initial Visit: Type: THERAPY  Noted: 2021  Today's Date: 2021  Patient seen for 7 sessions         FeljayConmaría Perezcuatecatl reports: he can still feel it's a little stiff and sore, but overall getting better.      Subjective     Objective          Active Range of Motion   Left Shoulder   Flexion: 170 degrees   Abduction: 170 degrees   External rotation 90°: 85 degrees   External rotation BTH: T4   Internal rotation 90°: 90 degrees   Internal rotation BTB: T10     Right Shoulder   External rotation BTH: T7   Internal rotation BTB: T5       See Exercise, Manual, and Modality Logs for complete treatment.       Assessment/Plan  Shortened session performed today due to late arrival. With stretching and joint mobilizations he can achieve 85 degrees of ER and has full passive IR PROM. His reaching behind the back is still limited but not painful. At this point he is reaching a stage where his ROM is good enough he could manage on his own, Dr Payton does not plan to see him unless his condition worsens. We will see him again in two weeks to make sure his HEP is helping, then anticipate discharge.  Visit Diagnoses:    ICD-10-CM ICD-9-CM   1. Acute pain of left shoulder  M25.512 719.41   2. Adhesive capsulitis of left shoulder  M75.02 726.0       Progress per Plan of Care           Timed:  Manual Therapy:    10     mins  43627;  Therapeutic Exercise:    10     mins  23755;     Neuromuscular Raul:        mins  21718;    Therapeutic Activity:     10     mins  57543;     Gait Training:           mins  14687;     Ultrasound:          mins  95391;    Electrical Stimulation:         mins  03030 ( );    Untimed:  Electrical Stimulation:         mins  85557 ( );  Mechanical Traction:         mins  36966;     Timed Treatment:   30   mins   Total Treatment:     30    nickie Villarreal PT, DPT, OCS  Physical Therapist

## 2021-09-02 ENCOUNTER — TELEMEDICINE (OUTPATIENT)
Dept: FAMILY MEDICINE CLINIC | Facility: TELEHEALTH | Age: 46
End: 2021-09-02

## 2021-09-02 DIAGNOSIS — M54.6 ACUTE BILATERAL THORACIC BACK PAIN: Primary | ICD-10-CM

## 2021-09-02 DIAGNOSIS — U07.1 COVID-19: ICD-10-CM

## 2021-09-02 PROCEDURE — 99213 OFFICE O/P EST LOW 20 MIN: CPT | Performed by: NURSE PRACTITIONER

## 2021-09-02 NOTE — PATIENT INSTRUCTIONS
No red flags for pnuemonia or pyelonephritis. Symptoms are probably a benign consequence of covid-19. May continue tylenol and follow up at urgent care if symptoms worsen or do not improve.  Flonase, mucinex, or sudafed for congestion. Push fluids.      Viral Illness, Adult  Viruses are tiny germs that can get into a person's body and cause illness. There are many different types of viruses, and they cause many types of illness. Viral illnesses can range from mild to severe. They can affect various parts of the body.  Common illnesses that are caused by a virus include colds and the flu (influenza). Viral illnesses also include serious conditions, such as HIV (human immunodeficiency virus) infection and AIDS (acquired immunodeficiency syndrome). A few viruses have been linked to certain cancers.  What are the causes?  Many types of viruses can cause illness. Viruses invade cells in your body, multiply, and cause the infected cells to work abnormally or die. When these cells die, they release more of the virus. When this happens, you develop symptoms of the illness, and the virus continues to spread to other cells. If the virus takes over the function of the cell, it can cause the cell to divide and grow out of control. This happens when a virus causes cancer.  Different viruses get into the body in different ways. You can get a virus by:  · Swallowing food or water that has come in contact with the virus (is contaminated).  · Breathing in droplets that have been coughed or sneezed into the air by an infected person.  · Touching a surface that has been contaminated with the virus and then touching your eyes, nose, or mouth.  · Being bitten by an insect or animal that carries the virus.  · Having sexual contact with a person who is infected with the virus.  · Being exposed to blood or fluids that contain the virus, either through an open cut or during a transfusion.  If a virus enters your body, your body's defense  system (immune system) will try to fight the virus. You may be at higher risk for a viral illness if your immune system is weak.  What are the signs or symptoms?  You may have these symptoms, depending on the type of virus and the location of the cells that it invades:  · Cold and flu viruses:  ? Fever.  ? Headache.  ? Sore throat.  ? Muscle aches.  ? Stuffy nose (nasal congestion).  ? Cough.  · Digestive system (gastrointestinal) viruses:  ? Fever.  ? Pain in the abdomen.  ? Nausea.  ? Diarrhea.  · Liver viruses (hepatitis):  ? Loss of appetite.  ? Tiredness.  ? Skin or the white parts of your eyes turning yellow (jaundice).  · Brain and spinal cord viruses:  ? Fever.  ? Headache.  ? Stiff neck.  ? Nausea and vomiting.  ? Confusion or sleepiness.  · Skin viruses:  ? Warts.  ? Itching.  ? Rash.  · Sexually transmitted viruses:  ? Discharge.  ? Swelling.  ? Redness.  ? Rash.  How is this diagnosed?  This condition may be diagnosed based on one or more of the following:  · Symptoms.  · Medical history.  · Physical exam.  · Blood test, sample of mucus from your lungs (sputum sample), stool sample, or a swab of body fluids or a skin sore (lesion).  How is this treated?  Viruses can be hard to treat because they live within cells. Antibiotic medicines do not treat viruses because these medicines do not get inside cells. Treatment for a viral illness may include:  · Resting and drinking plenty of fluids.  · Medicines to relieve symptoms. These can include over-the-counter medicine for pain and fever, medicines for cough or congestion, and medicines to relieve diarrhea.  · Antiviral medicines. These medicines are available only for certain types of viruses. They may help reduce flu symptoms if taken early in the infection. There are also many antiviral medicines for hepatitis and for HIV and AIDS.  Some viral illnesses can be prevented with vaccinations. A common example is the flu shot.  Follow these instructions at  home:  Medicines  · Take over-the-counter and prescription medicines only as told by your health care provider.  · If you were prescribed an antiviral medicine, take it as told by your health care provider. Do not stop taking the antiviral even if you start to feel better.  · Be aware of when antibiotics are needed and when they are not needed. Antibiotics do not treat viruses. You may get an antibiotic if your health care provider thinks that you may have, or are at risk for, a bacterial infection and you have a viral infection.  ? Do not ask for an antibiotic prescription if you have been diagnosed with a viral illness. Antibiotics will not make your illness go away faster.  ? Frequently taking antibiotics when they are not needed can lead to antibiotic resistance. When this develops, the medicine no longer works against the bacteria that it normally fights.  General instructions    · Drink enough fluids to keep your urine pale yellow.  · Rest as much as possible.  · Return to your normal activities as told by your health care provider. Ask your health care provider what activities are safe for you.  · Keep all follow-up visits as told by your health care provider. This is important.  How is this prevented?  To reduce your risk of viral illness:  · Wash your hands often with soap and water for at least 20 seconds. If soap and water are not available, use hand .  · Avoid touching your nose, eyes, and mouth, especially if you have not washed your hands recently.  · If anyone in your household has a viral infection, clean all household surfaces that may have been in contact with the virus. Use soap and hot water. You may also use bleach that you have added water to (diluted).  · Stay away from people who are sick with symptoms of a viral infection.  · Do not share items such as toothbrushes and water bottles with other people.  · Keep your vaccinations up to date. This includes getting a yearly flu  shot.  · Eat a healthy diet and get plenty of rest.  Contact a health care provider if:  · You have symptoms of a viral illness that do not go away.  · Your symptoms come back after going away.  · Your symptoms get worse.  Get help right away if you have:  · Trouble breathing.  · A severe headache or a stiff neck.  · Severe vomiting or pain in your abdomen.  These symptoms may represent a serious problem that is an emergency. Do not wait to see if the symptoms will go away. Get medical help right away. Call your local emergency services (911 in the [place] Do not drive yourself to the hospital.  Summary  · Viruses are types of germs that can get into a person's body and cause illness. Viral illnesses can range from mild to severe. They can affect various parts of the body.  · Viruses can be hard to treat. There are medicines to relieve symptoms, and there are some antiviral medicines.  · If you were prescribed an antiviral medicine, take it as told by your health care provider. Do not stop taking the antiviral even if you start to feel better.  · Contact a health care provider if you have symptoms of a viral illness that do not go away.  This information is not intended to replace advice given to you by your health care provider. Make sure you discuss any questions you have with your health care provider.  Document Revised: 10/27/2020 Document Reviewed: 10/27/2020  [employer] Patient Education © 2021 [employer]

## 2021-09-02 NOTE — PROGRESS NOTES
Ruben Barros is a 46 y.o. male.     He has a covid-19. Got a positive result on Monday this week. Yesterday he had pain in his back, at the level of the ribcage, worse on the right. It is discomfort rates it a 1-2 out of ten. Oxygen 96 today, 98 yesterday. Denies cough, shortness of breath, or fever. He is taking tylenol for pain. He wants to make sure the back pain isn't serious.       The following portions of the patient's history were reviewed and updated as appropriate: allergies, current medications, past family history, past medical history, past social history, past surgical history and problem list.    Review of Systems   Constitutional: Positive for fatigue. Negative for fever.   HENT: Positive for congestion.    Respiratory: Negative for cough, chest tightness and shortness of breath.    Gastrointestinal: Negative.    Genitourinary: Negative.    Musculoskeletal: Negative.    Neurological: Positive for headache. Negative for light-headedness.       Objective   Physical Exam  Constitutional:       General: He is not in acute distress.     Appearance: He is well-developed. He is not diaphoretic.   Pulmonary:      Effort: Pulmonary effort is normal.   Neurological:      Mental Status: He is alert and oriented to person, place, and time.   Psychiatric:         Behavior: Behavior normal.           Assessment/Plan   Diagnoses and all orders for this visit:    1. Acute bilateral thoracic back pain (Primary)    2. COVID-19        No red flags for pnuemonia or pyelonephritis. Provided reassurance, probably a benign consequence of covid-19. He can continue tylenol and follow up at urgent care if symptoms worsen or do not improve.    Flonase, mucinex, sudafed as needed for congestion, push fluids       The use of a video visit has been reviewed with the patient and verbal informed consent has been obtained. Myself and Emeka Gibson participated in this visit. The patient is located in  [place], [place]. I am located in [place], [place]. Mychart and Zoom were utilized. I spent 23 minutes in the patient's chart for this visit.

## 2021-09-15 ENCOUNTER — TREATMENT (OUTPATIENT)
Dept: PHYSICAL THERAPY | Facility: CLINIC | Age: 46
End: 2021-09-15

## 2021-09-15 DIAGNOSIS — M75.02 ADHESIVE CAPSULITIS OF LEFT SHOULDER: ICD-10-CM

## 2021-09-15 DIAGNOSIS — M25.512 ACUTE PAIN OF LEFT SHOULDER: Primary | ICD-10-CM

## 2021-09-15 PROCEDURE — 97140 MANUAL THERAPY 1/> REGIONS: CPT | Performed by: PHYSICAL THERAPIST

## 2021-09-15 PROCEDURE — 97530 THERAPEUTIC ACTIVITIES: CPT | Performed by: PHYSICAL THERAPIST

## 2021-09-15 PROCEDURE — 97110 THERAPEUTIC EXERCISES: CPT | Performed by: PHYSICAL THERAPIST

## 2021-09-15 NOTE — PROGRESS NOTES
Re-Assessment / Re-Certification      Patient: Everton Barros   : 1975  Diagnosis/ICD-10 Code:  Acute pain of left shoulder [M25.512]  Referring practitioner: Chari Payton MD  Date of Initial Visit: Type: THERAPY  Noted: 2021  Today's Date: 9/15/2021  Patient seen for 8 sessions      Subjective:   Everton Barros reports: he had a setback where he tried to lift a box at work and he's had shoulder pain ever since and he feels like his shoulder is beginning to tighten back up.  Subjective Questionnaire: QuickDASH: 11.36  Clinical Progress: worse  Home Program Compliance: No  Treatment has included: therapeutic exercise, neuromuscular re-education, manual therapy, therapeutic activity, moist heat and cryotherapy    Subjective Evaluation    Pain  Current pain ratin  At worst pain ratin         Objective          Active Range of Motion   Left Shoulder   Flexion: 145 degrees   Abduction: 135 degrees   External rotation 45°: 60 degrees   External rotation BTH: C7   Internal rotation BTB: L2     Right Shoulder   Flexion: 160 degrees   Abduction: 150 degrees   External rotation 45°: 80 degrees   External rotation BTH: C7   Internal rotation BTB: T7     Strength/Myotome Testing     Left Shoulder     Planes of Motion   Flexion: 4+   Abduction: 4+   External rotation at 0°: 4+   Internal rotation at 0°: 4+     Right Shoulder     Planes of Motion   Flexion: 4+   Abduction: 4+   External rotation at 0°: 4+   Internal rotation at 0°: 4+     Tests   Cervical     Left   Negative active compression ([place]).     Left Shoulder   Negative AC shear, apprehension, clunk, crank, drop arm, empty can, full can, Hawkin's, Neer's, painful arc and passive horizontal adduction.       Assessment & Plan     Assessment  Impairments: abnormal muscle tone, abnormal or restricted ROM, activity intolerance, impaired physical strength, lacks appropriate home exercise program and pain with  function  Assessment details: Emeka returns to PT with an exacerbation of his L shoulder pain and dysfunction. He reports aggravating his shoulder at work trying to lift a heavy box overhead then he was also sick with COVID-19 for a couple of weeks. His ROM has regressed but his pain is relatively controlled today. He would benefit from additional skilled therapy to address his ROM restrictions and help him return to work and full premorbid function.   Prognosis: good  Functional Limitations: carrying objects, lifting, pulling, pushing, reaching behind back, reaching overhead and unable to perform repetitive tasks  Goals  Plan Goals: Short Term Goals (achieve in 4 weeks):  1. Pt will decrease current pain to 0/10. (met 7/16/2021)  2. Pt will decrease QuickDASH score to 30% or less.(met 7/16/2021)  3. Pt will increase AROM flexion of L shoulder to 145 degrees.(met 7/16/2021)  4. Pt will improve AROM L shoulder ER to 60 degrees. (not met)  Long Term Goals (6-8 weeks):  5. Pt will improve L shoulder flexion AROM to 160 degrees. (not met)  6. Pt will exhibit decreased tenderness to palpation of L AC joint.(met 7/16/2021)  7. Pt will sleep on L side with no increase in shoulder pain.(met 7/16/2021)  8. Pt will increase L UE ER BTH to T7 to be symmetrical with contralateral UE.      Plan  Therapy options: will be seen for skilled physical therapy services  Planned modality interventions: cryotherapy, TENS, thermotherapy (hydrocollator packs) and ultrasound  Planned therapy interventions: ADL retraining, flexibility, functional ROM exercises, home exercise program, IADL retraining, joint mobilization, therapeutic activities, stretching, strengthening, soft tissue mobilization, postural training and manual therapy  Frequency: 2x week  Duration in weeks: 12  Treatment plan discussed with: patient        Visit Diagnoses:    ICD-10-CM ICD-9-CM   1. Acute pain of left shoulder  M25.512 719.41   2. Adhesive capsulitis of left  shoulder  M75.02 726.0       Progress toward previous goals: Partially Met      Recommendations: Continue as planned  Timeframe: 1 month  Prognosis to achieve goals: fair    PT Signature: Qamar Villarreal PT, DPT, OCS      Based upon review of the patient's progress and continued therapy plan, it is my medical opinion that EmekaConde Francolocuatecatl should continue physical therapy treatment at [facility]  [address] [place] [id number] [phone].    Signature: __________________________________  Chari Payton MD    Timed:  Manual Therapy:    10     mins  93762;  Therapeutic Exercise:    10     mins  14820;     Neuromuscular Raul:        mins  25099;    Therapeutic Activity:     15     mins  42965;     Gait Training:           mins  64679;     Ultrasound:          mins  14093;    Electrical Stimulation:         mins  24147 ( );    Untimed:  Electrical Stimulation:         mins  09917 ( );  Mechanical Traction:         mins  85473;     Timed Treatment:   35   mins   Total Treatment:     35   mins

## 2021-09-16 NOTE — PATIENT INSTRUCTIONS
Access Code: [email or web address]  URL: [email or web address]  Date: 09/16/2021  Prepared by: Qamar Villarreal    Exercises  Supine Shoulder External Rotation in 45 Degrees Abduction AAROM with Dowel - 2-3 x daily - 7 x weekly - 3 sets - 10 reps  Shoulder External Rotation and Scapular Retraction - 2-3 x daily - 7 x weekly - 3 sets - 10 reps  Seated Shoulder External Rotation AAROM with Cane and Hand in Neutral - 2-3 x daily - 7 x weekly - 3 sets - 10 reps  Standing Shoulder Internal Rotation Stretch with Towel - 2-3 x daily - 7 x weekly - 10 reps - 10 hold  Standing Bilateral Shoulder Internal Rotation AAROM with Dowel - 2-3 x daily - 7 x weekly - 10 reps - 10 hold

## 2021-10-11 ENCOUNTER — TREATMENT (OUTPATIENT)
Dept: PHYSICAL THERAPY | Facility: CLINIC | Age: 46
End: 2021-10-11

## 2021-10-11 DIAGNOSIS — M75.02 ADHESIVE CAPSULITIS OF LEFT SHOULDER: ICD-10-CM

## 2021-10-11 DIAGNOSIS — M25.512 ACUTE PAIN OF LEFT SHOULDER: Primary | ICD-10-CM

## 2021-10-11 PROCEDURE — 97140 MANUAL THERAPY 1/> REGIONS: CPT | Performed by: PHYSICAL THERAPIST

## 2021-10-11 PROCEDURE — 97110 THERAPEUTIC EXERCISES: CPT | Performed by: PHYSICAL THERAPIST

## 2021-10-11 PROCEDURE — 97530 THERAPEUTIC ACTIVITIES: CPT | Performed by: PHYSICAL THERAPIST

## 2021-10-11 NOTE — PROGRESS NOTES
Physical Therapy Daily Progress Note    Patient: Everton Barros   : 1975  Diagnosis/ICD-10 Code:  Acute pain of left shoulder [M25.512]  Referring practitioner: Chari Payton MD  Date of Initial Visit: Type: THERAPY  Noted: 2021  Today's Date: 10/11/2021  Patient seen for 9 sessions         Everton Pikecatparveen reports: reaching behind the back is when I feel the most restriction and pain.    Subjective     Objective   See Exercise, Manual, and Modality Logs for complete treatment.       Assessment/Plan  Subjectively, pt reports no increase of pain or discomfort with interventions performed today. Performed well with continued exercises for shoulder mobility and addition of scapular strengthening/stability. Continues to demonstrate good tolerance to exercise progressions. Continues to benefit from verbal/tactile cues to ensure proper form and technique for exercise performance.     Progress per Plan of Care           Manual Therapy:    12     mins  91065;  Therapeutic Exercise:    20     mins  71541;     Neuromuscular Raul:        mins  52206;    Therapeutic Activity:     10     mins  32271;     Gait Training:           mins  28034;     Ultrasound:          mins  74727;    Electrical Stimulation:         mins  65661 ( );  Dry Needling          mins self-pay    Timed Treatment:   42   mins   Total Treatment:     42   mins    Callie Finnegan PTA  Physical Therapist Assistant [id number]

## 2021-10-18 ENCOUNTER — TREATMENT (OUTPATIENT)
Dept: PHYSICAL THERAPY | Facility: CLINIC | Age: 46
End: 2021-10-18

## 2021-10-18 DIAGNOSIS — M25.512 ACUTE PAIN OF LEFT SHOULDER: Primary | ICD-10-CM

## 2021-10-18 DIAGNOSIS — M75.02 ADHESIVE CAPSULITIS OF LEFT SHOULDER: ICD-10-CM

## 2021-10-18 PROCEDURE — 97530 THERAPEUTIC ACTIVITIES: CPT | Performed by: PHYSICAL THERAPIST

## 2021-10-18 PROCEDURE — 97140 MANUAL THERAPY 1/> REGIONS: CPT | Performed by: PHYSICAL THERAPIST

## 2021-10-18 PROCEDURE — 97110 THERAPEUTIC EXERCISES: CPT | Performed by: PHYSICAL THERAPIST

## 2021-10-18 NOTE — PROGRESS NOTES
Physical Therapy Daily Progress Note    Patient: Everton Gamezlocuatecatl   : 1975  Diagnosis/ICD-10 Code:  Acute pain of left shoulder [M25.512]  Referring practitioner: Chari Payton MD  Date of Initial Visit: Type: THERAPY  Noted: 2021  Today's Date: 10/18/2021  Patient seen for 10 sessions         FeljayConde Zelocuatecatl reports: shoulder is getting better, making improvements.     Subjective     Objective   See Exercise, Manual, and Modality Logs for complete treatment.       Assessment/Plan  Subjectively, pt reports no increase of pain or discomfort with interventions performed today. Performed well with added thoracic and chest mobility interventions. Continues to demonstrate stiffness and end range tightness in all planes, although most limited in rotational planes.  Continues to benefit from verbal/tactile cues to ensure proper form and technique for exercise performance.     Progress per Plan of Care           Manual Therapy:    15     mins  79278;  Therapeutic Exercise:    15     mins  55092;     Neuromuscular Raul:        mins  70132;    Therapeutic Activity:     10     mins  28581;     Gait Training:           mins  54764;     Ultrasound:          mins  92494;    Electrical Stimulation:         mins  47166 ( );  Dry Needling          mins self-pay    Timed Treatment:   40   mins   Total Treatment:     40   mins    Callie Finnegan PTA  Physical Therapist Assistant [id number]

## 2021-10-25 ENCOUNTER — TREATMENT (OUTPATIENT)
Dept: PHYSICAL THERAPY | Facility: CLINIC | Age: 46
End: 2021-10-25

## 2021-10-25 DIAGNOSIS — M75.02 ADHESIVE CAPSULITIS OF LEFT SHOULDER: ICD-10-CM

## 2021-10-25 DIAGNOSIS — M25.512 ACUTE PAIN OF LEFT SHOULDER: Primary | ICD-10-CM

## 2021-10-25 PROCEDURE — 97140 MANUAL THERAPY 1/> REGIONS: CPT | Performed by: PHYSICAL THERAPIST

## 2021-10-25 PROCEDURE — 97110 THERAPEUTIC EXERCISES: CPT | Performed by: PHYSICAL THERAPIST

## 2021-11-02 NOTE — PROGRESS NOTES
Re-Certification      Patient: Everton Barros   : 1975  Diagnosis/ICD-10 Code:  Adhesive capsulitis of left shoulder [M75.02]  Referring practitioner: Chari Payton MD  Date of Initial Visit: Type: THERAPY  Noted: 2021  Today's Date: 2021  Patient seen for 12 sessions      Subjective:   Subjective Questionnaire: QuickDASH: 5% (11% on )  Clinical Progress: worse  Home Program Compliance: Yes  Treatment has included: therapeutic exercise, neuromuscular re-education, manual therapy, therapeutic activity, moist heat and cryotherapy    Subjective Evaluation    History of Present Illness  Mechanism of injury: I'm still noticing some tightness in my shoulder with pain that goes down the front/side of my shoulder.  Pain is usually not more than 2/10 with reaching and behind the back movements. I sometimes have pain when I sleep on my L side.          Objective          Tenderness     Left Shoulder   Tenderness in the biceps tendon (proximal). No tenderness in the subacromial bursa.     Active Range of Motion   Left Shoulder   Flexion: 146 degrees   Abduction: 85 (160 in scapular plane) degrees with pain  External rotation BTH: T4   Internal rotation BTB: T10     Passive Range of Motion   Left Shoulder   Flexion: 155 degrees   Abduction: 90 degrees with pain  External rotation 45°: 22 degrees with pain  Internal rotation 45°: 48 degrees     Joint Play   Left Shoulder  Hypomobile in the anterior capsule, posterior capsule and inferior capsule.    Strength/Myotome Testing     Left Shoulder     Planes of Motion   Flexion: 5   Abduction: 5     Isolated Muscles   Supraspinatus: 5     Tests     Left Shoulder   Negative empty can, Hawkin's and passive horizontal adduction.       Assessment/Plan   Notable capsular pattern that was likely triggered by lifting box at work in early September.  Pt has had only 4 visits that  reassessment and may benefit from greater frequency of PT appts to address  capsular restrictions though may require further medical management.  Exercises were advanced today to address ROM deficits.   Progress toward previous goals: Partially Met    Goals  Short Term Goals (achieve in 4 weeks):  1. Pt will decrease current pain to 0/10.UNMET  2. Pt will decrease QuickDASH score to 30% or less.(met 7/16/2021)  3. Pt will increase AROM flexion of L shoulder to 145 degrees.(met 7/16/2021)  4. Pt will improve AROM L shoulder ER to 60 degrees. (not met)  Long Term Goals (6-8 weeks):  5. Pt will improve L shoulder flexion AROM to 160 degrees. (not met)  6. Pt will exhibit decreased tenderness to palpation of L AC joint.(met 7/16/2021)  7. Pt will sleep on L side with no increase in shoulder pain - UNMET  8. Pt will increase L UE ER BTH to T7 to be symmetrical with contralateral UE - UNMET     Recommendations: Continue with recommendations follow up with MD regarding ROM restrictions.   Timeframe: 6 weeks  Prognosis to achieve goals: good    PT Signature: Suze Woodson, PT  [place] License # [id number]    Based upon review of the patient's progress and continued therapy plan, it is my medical opinion that FelixConde Zelocuatecatl should continue physical therapy treatment at [facility]  [address] [place] [id number] [phone].    Signature: __________________________________  Chari Payton MD    Timed:  Manual Therapy:    20     mins  28473;  Therapeutic Exercise:    23     mins  13566;     Neuromuscular Raul:    -    mins  00880;    Therapeutic Activity:     10     mins  68936;     Gait Training:      -     mins  07109;     Ultrasound:     -     mins  35601;    Iontophoresis                 -     mins 43366    Timed Treatment:   53   mins   Total Treatment:     53   mins

## 2021-11-08 ENCOUNTER — TREATMENT (OUTPATIENT)
Dept: PHYSICAL THERAPY | Facility: CLINIC | Age: 46
End: 2021-11-08

## 2021-11-08 DIAGNOSIS — M25.512 ACUTE PAIN OF LEFT SHOULDER: ICD-10-CM

## 2021-11-08 DIAGNOSIS — M75.02 ADHESIVE CAPSULITIS OF LEFT SHOULDER: Primary | ICD-10-CM

## 2021-11-08 PROCEDURE — 97140 MANUAL THERAPY 1/> REGIONS: CPT | Performed by: PHYSICAL THERAPIST

## 2021-11-08 PROCEDURE — 97110 THERAPEUTIC EXERCISES: CPT | Performed by: PHYSICAL THERAPIST

## 2021-11-08 PROCEDURE — 97530 THERAPEUTIC ACTIVITIES: CPT | Performed by: PHYSICAL THERAPIST

## 2021-11-08 NOTE — PATIENT INSTRUCTIONS
Access Code: [email or web address]  URL: [email or web address]  Date: 11/08/2021  Prepared by: Suze Woodson    Exercises  Seated Shoulder Inferior Glide - 2 x daily - 1 sets - 3 reps - 20 hold  Snow Squirrel [place] - 2 x daily - 1 sets - 15 reps - 5 hold  Single Arm Wall [place] - 2 x daily - 1 sets - 15 reps - 5 hold

## 2021-11-15 ENCOUNTER — TREATMENT (OUTPATIENT)
Dept: PHYSICAL THERAPY | Facility: CLINIC | Age: 46
End: 2021-11-15

## 2021-11-15 DIAGNOSIS — M25.512 ACUTE PAIN OF LEFT SHOULDER: ICD-10-CM

## 2021-11-15 DIAGNOSIS — M75.02 ADHESIVE CAPSULITIS OF LEFT SHOULDER: Primary | ICD-10-CM

## 2021-11-15 PROCEDURE — 97530 THERAPEUTIC ACTIVITIES: CPT | Performed by: PHYSICAL THERAPIST

## 2021-11-15 PROCEDURE — 97110 THERAPEUTIC EXERCISES: CPT | Performed by: PHYSICAL THERAPIST

## 2021-11-15 PROCEDURE — 97140 MANUAL THERAPY 1/> REGIONS: CPT | Performed by: PHYSICAL THERAPIST

## 2021-11-15 NOTE — PROGRESS NOTES
Physical Therapy Daily Progress Note    Patient: Everton Gamezlocuatecatl   : 1975  Diagnosis/ICD-10 Code:  Adhesive capsulitis of left shoulder [M75.02]  Referring practitioner: Chari Payton MD  Date of Initial Visit: Type: THERAPY  Noted: 2021  Today's Date: 11/15/2021  Patient seen for 13 sessions         FeljayConde Zelocuatecatl reports: no changes with shoulder. Still stiff and painful with certain movements. Haven't scheduled follow up with MD yet.    Subjective     Objective   See Exercise, Manual, and Modality Logs for complete treatment.       Assessment/Plan  Subjectively, pt reports no increase of pain or discomfort with interventions performed today. Performed well with continued shoulder mobility interventions. Continues to demonstrate most limited restrictions in ER. Continued to recommend follow up with MD. Continues to benefit from verbal/tactile cues to ensure proper form and technique for exercise performance.     Progress per Plan of Care           Manual Therapy:    15     mins  16442;  Therapeutic Exercise:    20     mins  49869;     Neuromuscular Raul:        mins  11029;    Therapeutic Activity:     8     mins  86559;     Gait Training:           mins  98369;     Ultrasound:          mins  79307;    Electrical Stimulation:         mins  54616 ( );  Dry Needling          mins self-pay    Timed Treatment:   43   mins   Total Treatment:     53   mins    Callie Finnegan PTA  Physical Therapist Assistant [id number]

## 2023-08-03 ENCOUNTER — APPOINTMENT (OUTPATIENT)
Dept: CT IMAGING | Facility: HOSPITAL | Age: 48
End: 2023-08-03
Payer: COMMERCIAL

## 2023-08-03 ENCOUNTER — HOSPITAL ENCOUNTER (EMERGENCY)
Facility: HOSPITAL | Age: 48
Discharge: HOME | End: 2023-08-03
Payer: COMMERCIAL

## 2023-08-03 VITALS
HEIGHT: 68 IN | SYSTOLIC BLOOD PRESSURE: 123 MMHG | OXYGEN SATURATION: 99 % | DIASTOLIC BLOOD PRESSURE: 93 MMHG | WEIGHT: 190 LBS | RESPIRATION RATE: 15 BRPM | HEART RATE: 72 BPM | TEMPERATURE: 98.2 F | BODY MASS INDEX: 28.79 KG/M2

## 2023-08-03 DIAGNOSIS — R42 DIZZY: ICD-10-CM

## 2023-08-03 DIAGNOSIS — R51.9 GENERALIZED HEADACHE: Primary | ICD-10-CM

## 2023-08-03 LAB
ALBUMIN SERPL-MCNC: 4.7 G/DL (ref 3.5–5.2)
ALBUMIN/GLOB SERPL: 1.6 G/DL
ALP SERPL-CCNC: 111 U/L (ref 39–117)
ALT SERPL W P-5'-P-CCNC: 29 U/L (ref 1–41)
ANION GAP SERPL CALCULATED.3IONS-SCNC: 12 MMOL/L (ref 5–15)
AST SERPL-CCNC: 20 U/L (ref 1–40)
BASOPHILS # BLD AUTO: 0.02 10*3/MM3 (ref 0–0.2)
BASOPHILS NFR BLD AUTO: 0.3 % (ref 0–1.5)
BILIRUB SERPL-MCNC: 0.3 MG/DL (ref 0–1.2)
BUN SERPL-MCNC: 16 MG/DL (ref 6–20)
BUN/CREAT SERPL: 21.6 (ref 7–25)
CALCIUM SPEC-SCNC: 9.7 MG/DL (ref 8.6–10.5)
CHLORIDE SERPL-SCNC: 102 MMOL/L (ref 98–107)
CO2 SERPL-SCNC: 24 MMOL/L (ref 22–29)
CREAT SERPL-MCNC: 0.74 MG/DL (ref 0.76–1.27)
DEPRECATED RDW RBC AUTO: 43.2 FL (ref 37–54)
EGFRCR SERPLBLD CKD-EPI 2021: 112.5 ML/MIN/1.73
EOSINOPHIL # BLD AUTO: 0.14 10*3/MM3 (ref 0–0.4)
EOSINOPHIL NFR BLD AUTO: 2.1 % (ref 0.3–6.2)
ERYTHROCYTE [DISTWIDTH] IN BLOOD BY AUTOMATED COUNT: 13 % (ref 12.3–15.4)
GLOBULIN UR ELPH-MCNC: 2.9 GM/DL
GLUCOSE SERPL-MCNC: 101 MG/DL (ref 65–99)
HCT VFR BLD AUTO: 45.4 % (ref 37.5–51)
HGB BLD-MCNC: 15.3 G/DL (ref 13–17.7)
IMM GRANULOCYTES # BLD AUTO: 0.03 10*3/MM3 (ref 0–0.05)
IMM GRANULOCYTES NFR BLD AUTO: 0.4 % (ref 0–0.5)
LYMPHOCYTES # BLD AUTO: 1.84 10*3/MM3 (ref 0.7–3.1)
LYMPHOCYTES NFR BLD AUTO: 27.1 % (ref 19.6–45.3)
MCH RBC QN AUTO: 30.4 PG (ref 26.6–33)
MCHC RBC AUTO-ENTMCNC: 33.7 G/DL (ref 31.5–35.7)
MCV RBC AUTO: 90.1 FL (ref 79–97)
MONOCYTES # BLD AUTO: 0.42 10*3/MM3 (ref 0.1–0.9)
MONOCYTES NFR BLD AUTO: 6.2 % (ref 5–12)
NEUTROPHILS NFR BLD AUTO: 4.35 10*3/MM3 (ref 1.7–7)
NEUTROPHILS NFR BLD AUTO: 63.9 % (ref 42.7–76)
PLATELET # BLD AUTO: 159 10*3/MM3 (ref 140–450)
PMV BLD AUTO: 11 FL (ref 6–12)
POTASSIUM SERPL-SCNC: 3.8 MMOL/L (ref 3.5–5.2)
PROT SERPL-MCNC: 7.6 G/DL (ref 6–8.5)
RBC # BLD AUTO: 5.04 10*6/MM3 (ref 4.14–5.8)
SODIUM SERPL-SCNC: 138 MMOL/L (ref 136–145)
TROPONIN T SERPL HS-MCNC: 9 NG/L
WBC NRBC COR # BLD: 6.8 10*3/MM3 (ref 3.4–10.8)

## 2023-08-03 PROCEDURE — 99284 EMERGENCY DEPT VISIT MOD MDM: CPT

## 2023-08-03 PROCEDURE — 93005 ELECTROCARDIOGRAM TRACING: CPT | Performed by: PHYSICIAN ASSISTANT

## 2023-08-03 PROCEDURE — 36415 COLL VENOUS BLD VENIPUNCTURE: CPT

## 2023-08-03 PROCEDURE — 93010 ELECTROCARDIOGRAM REPORT: CPT | Performed by: INTERNAL MEDICINE

## 2023-08-03 PROCEDURE — 85025 COMPLETE CBC W/AUTO DIFF WBC: CPT | Performed by: PHYSICIAN ASSISTANT

## 2023-08-03 PROCEDURE — 84484 ASSAY OF TROPONIN QUANT: CPT | Performed by: PHYSICIAN ASSISTANT

## 2023-08-03 PROCEDURE — 70450 CT HEAD/BRAIN W/O DYE: CPT

## 2023-08-03 PROCEDURE — 80053 COMPREHEN METABOLIC PANEL: CPT | Performed by: PHYSICIAN ASSISTANT

## 2023-08-03 RX ORDER — NAPROXEN 500 MG/1
500 TABLET ORAL 2 TIMES DAILY WITH MEALS
Qty: 14 TABLET | Refills: 0 | Status: SHIPPED | OUTPATIENT
Start: 2023-08-03 | End: 2023-08-10

## 2023-08-03 NOTE — FSED PROVIDER NOTE
Subjective   History of Present Illness    Working out this morning, doing heavy lifting with his  when approximately one hour after he worked out he developed gradual onset dizziness and mild generalized headache.  He reports that the dizziness lasted for approximate 20 minutes and spontaneously resolved.  At that time his  measured his blood pressure and it was 90s over 70s but improved after sitting down for several minutes.  He is still complaining of the headache.  Patient reports that his headache is mild but he has never had a headache like this before.  Denies vision changes or altered mental status.    Review of Systems   Constitutional:  Negative for activity change and appetite change.   Eyes:  Negative for pain.   Respiratory:  Negative for shortness of breath.    Gastrointestinal:  Negative for nausea and vomiting.   Musculoskeletal:  Negative for arthralgias.   Skin:  Negative for color change.   Neurological:  Positive for dizziness and headaches.   All other systems reviewed and are negative.    Past Medical History:   Diagnosis Date    Dermatitis     Hyperlipidemia        Allergies   Allergen Reactions    Ketorolac GI Intolerance       History reviewed. No pertinent surgical history.    Family History   Problem Relation Age of Onset    No Known Problems Mother     Hypertension Father         Exercise     Diabetes Father     No Known Problems Sister     No Known Problems Brother     No Known Problems Sister     Cancer Paternal Grandmother     Heart attack Neg Hx     Stroke Neg Hx        Social History     Socioeconomic History    Marital status:    Tobacco Use    Smoking status: Never    Smokeless tobacco: Never   Substance and Sexual Activity    Alcohol use: Yes     Comment: occ    Drug use: No    Sexual activity: Yes     Partners: Female     Comment:             Objective   Physical Exam  Vitals and nursing note reviewed.   Constitutional:        Appearance: Normal appearance. He is normal weight.   HENT:      Head: Normocephalic and atraumatic.      Nose: Nose normal.      Mouth/Throat:      Mouth: Mucous membranes are moist.   Eyes:      Pupils: Pupils are equal, round, and reactive to light.   Cardiovascular:      Rate and Rhythm: Normal rate and regular rhythm.      Pulses: Normal pulses.      Heart sounds: Normal heart sounds.   Pulmonary:      Effort: Pulmonary effort is normal.      Breath sounds: Normal breath sounds.   Musculoskeletal:         General: Normal range of motion.      Cervical back: Normal range of motion.      Right lower leg: No edema.      Left lower leg: No edema.   Skin:     General: Skin is warm.   Neurological:      General: No focal deficit present.      Mental Status: He is alert.      Comments: Normal neuro exam   Psychiatric:         Behavior: Behavior is cooperative.       Procedures           ED Course                                           Medical Decision Making  Problems Addressed:  Dizzy: complicated acute illness or injury  Generalized headache: complicated acute illness or injury    Amount and/or Complexity of Data Reviewed  Labs: ordered.  Radiology: ordered.  ECG/medicine tests: ordered.    Risk  Prescription drug management.        Final diagnoses:   Generalized headache   Dizzy       ED Disposition  ED Disposition       ED Disposition   Discharge    Condition   Stable    Comment   --               Julissa Krishnamurthy MD  [address] [id number] [phone]               Medication List        New Prescriptions      naproxen 500 MG tablet  Commonly known as: NAPROSYN  Take 1 tablet by mouth 2 (Two) Times a Day With Meals for 7 days.               Where to Get Your Medications        These medications were sent to [id number] - [place], [place] - [address] [facility] AT [facility] [address] - [phone] [facility] [phone]   [address] [facility] [address]       Phone: [phone]   naproxen 500 MG tablet         Attestation:  Jerri Elias MD - Patient was evaluated and treated by the midlevel. I was not involved in this patient's care. I was available in the ER for consult at the time.

## 2023-08-03 NOTE — DISCHARGE INSTRUCTIONS
Take the medication as prescribed. Light activity for the next few days. Follow up with your primary care doctor next week to recheck your symptoms.

## 2023-08-04 LAB — QT INTERVAL: 398 MS

## 2023-10-12 ENCOUNTER — OFFICE VISIT (OUTPATIENT)
Dept: FAMILY MEDICINE CLINIC | Facility: CLINIC | Age: 48
End: 2023-10-12
Payer: COMMERCIAL

## 2023-10-12 VITALS
HEART RATE: 86 BPM | TEMPERATURE: 97.6 F | BODY MASS INDEX: 30.7 KG/M2 | WEIGHT: 191 LBS | HEIGHT: 66 IN | DIASTOLIC BLOOD PRESSURE: 80 MMHG | OXYGEN SATURATION: 99 % | SYSTOLIC BLOOD PRESSURE: 116 MMHG

## 2023-10-12 DIAGNOSIS — G47.10 HYPERSOMNIA: ICD-10-CM

## 2023-10-12 DIAGNOSIS — Z12.11 SCREEN FOR COLON CANCER: ICD-10-CM

## 2023-10-12 DIAGNOSIS — E78.49 OTHER HYPERLIPIDEMIA: Primary | ICD-10-CM

## 2023-10-12 DIAGNOSIS — R06.83 SNORING: ICD-10-CM

## 2023-10-12 DIAGNOSIS — Z11.59 ENCOUNTER FOR HEPATITIS C SCREENING TEST FOR LOW RISK PATIENT: ICD-10-CM

## 2023-10-12 DIAGNOSIS — R42 DIZZY SPELLS: ICD-10-CM

## 2023-10-12 DIAGNOSIS — H93.8X3 EAR PRESSURE, BILATERAL: ICD-10-CM

## 2023-10-12 DIAGNOSIS — R94.31 ABNORMAL EKG: ICD-10-CM

## 2023-10-12 PROBLEM — G44.209 TENSION HEADACHE: Status: RESOLVED | Noted: 2020-11-16 | Resolved: 2023-10-12

## 2023-10-12 PROBLEM — I95.1 ORTHOSTATIC HYPOTENSION: Status: RESOLVED | Noted: 2020-11-16 | Resolved: 2023-10-12

## 2023-10-12 PROBLEM — L71.9 ROSACEA: Status: RESOLVED | Noted: 2019-02-08 | Resolved: 2023-10-12

## 2023-10-12 NOTE — PROGRESS NOTES
Chief Complaint  Ear Fullness ( Two episodes with  ear fullness  after working out   and episodes with dizzines went to er the first time all the studies were fine ) and Sleeping Problem ( Pt  is waking up tired and snoring at night a lot is interested in sleep study   if possible )    Subjective        Everton Barros presents to [facility]  Ear Fullness       Pleasant 48-year-old male here to follow-up for difficulty sleeping with snoring and ear fullness.  Encounter performed in Lithuanian and English.    He was last seen here in 2019, since then he has been doing well.  Working on maintaining his health.  He has been traveling annually to [place] and has been getting routine checks when he is there.  He showed me some of his labs which showed very high triglycerides, 1 high calcium cardiac score, remaining labs were   normal.      Hyperlipidemia: Not well controlled and elevated at last check-he has been doing weight lifting weights, trying to eat healthier.  Not on medications currently.  No family history of CAD, not a smoker.      With ear clog symptoms: All 3 episodes have occurred when he has exercise lifting heavy weights: He stood to walk with his  and felt that both ears were clogged, got dizzy with a headache in the front and back of the head., blood pressure was checked at 140/77, He went to the ER and had had anormal eval at the ER, but the symptoms have persisted.  He has been working to maintain hydration and eat before his exercise.  Each episode will last somewhere between 1 to 2 hours.  Only with exertion with heavy weights.      He has been snoring episodically, he was waking up feeling tired with a headache, he is gotten a pillow to help with sleep apnea and a jaw support which he thinks has been helpful and has been  "waking up more rested.    Some GERD that he is hoping will improve with diet.      Objective   Vital Signs:  /80   Pulse 86   Temp 97.6 øF (36.4 øC)   Ht 167.6 cm (66\")   Wt 86.6 kg (191 lb)   SpO2 99%   BMI 30.83 kg/mý   Estimated body mass index is 30.83 kg/mý as calculated from the following:    Height as of this encounter: 167.6 cm (66\").    Weight as of this encounter: 86.6 kg (191 lb).       BMI is >= 30 and <35. (Class 1 Obesity). The following options were offered after discussion;: exercise counseling/recommendations and nutrition counseling/recommendations      Physical Exam  Vitals and nursing note reviewed.   Constitutional:       General: He is not in acute distress.     Appearance: He is well-developed.   HENT:      Head: Normocephalic.      Nose: Nose normal.   Cardiovascular:      Rate and Rhythm: Normal rate and regular rhythm.      Heart sounds: Normal heart sounds. No murmur heard.  Pulmonary:      Effort: Pulmonary effort is normal. No respiratory distress.      Breath sounds: Normal breath sounds.   Musculoskeletal:         General: Normal range of motion.   Skin:     General: Skin is warm and dry.      Findings: No rash.   Neurological:      Mental Status: He is alert and oriented to person, place, and time.   Psychiatric:         Behavior: Behavior normal.         Thought Content: Thought content normal.         Judgment: Judgment normal.        Result Review :  The following data was reviewed by: Julissa Krishnamurthy MD on 10/12/2023:  CBC & Differential (08/03/2023 18:17)  Comprehensive Metabolic Panel (08/03/2023 18:17)  Single High Sensitivity Troponin T (08/03/2023 18:17)  CT Head Without Contrast (08/03/2023 19:06)       ECG 12 Lead    Date/Time: 10/12/2023 5:38 PM  Performed by: Julissa Krishnamurthy MD    Authorized by: Julissa Krishnamurthy MD  Comparison: compared with previous ECG from 8/4/2023  Rhythm: sinus rhythm  Rate: normal  Conduction: conduction normal  ST Segments: ST segments " normal  T Waves: T waves normal  QRS axis: normal  Other: no other findings    Clinical impression: non-specific ECG  Comments: No acute event seen on EKG,, some questionable Q waves in leads II and III            Assessment and Plan   Diagnoses and all orders for this visit:    1. Other hyperlipidemia (Primary)  -     Comprehensive Metabolic Panel  -     CBC & Differential  -     Lipid Panel  -     TSH Rfx On Abnormal To Free T4  -     Cancel: CT Cardiac Calcium Score Without Dye; Future  -     Ambulatory Referral to Cardiology  -     Treadmill Stress Test; Future  -     Adult Transthoracic Echo Complete W/ Cont if Necessary Per Protocol; Future    2. Dizzy spells  -     Ambulatory Referral to Sleep Medicine  -     Ambulatory Referral to ENT (Otolaryngology)  -     Cancel: CT Cardiac Calcium Score Without Dye; Future  -     Ambulatory Referral to Cardiology  -     Treadmill Stress Test; Future  -     Adult Transthoracic Echo Complete W/ Cont if Necessary Per Protocol; Future    3. Encounter for hepatitis C screening test for low risk patient  -     Hepatitis C Antibody    4. Screen for colon cancer  -     Cologuard - Stool, Per Rectum; Future    5. Ear pressure, bilateral  -     Ambulatory Referral to ENT (Otolaryngology)    6. Hypersomnia  -     Ambulatory Referral to Sleep Medicine    7. Snoring  -     Ambulatory Referral to Sleep Medicine    8. Abnormal EKG  -     Ambulatory Referral to Cardiology  -     Treadmill Stress Test; Future  -     Adult Transthoracic Echo Complete W/ Cont if Necessary Per Protocol; Future    Other orders  -     ECG 12 Lead    Pleasant 48-year-old male here for concerns of episodes of exertional dizzy spells with ear pain.  He does have known hyperlipidemia and some calcium scoring in [place] that was elevated.  I do think further cardiovascular work-up is necessary.  EKG today shows some possible abnormal findings.  He is also past due on many screening test and does have symptoms of  sleep apnea.    Plan:  - Further cardiac work-up with stress test, echo and follow-up with cardiology.  - Further work-up for obstructive sleep apnea with sleep medicine as above.  - With symptoms of ear fullness and dizziness will rule out vertigo, refer to ENT as above  - Fasting labs as above  - Cologuard due as above  - We will follow-up in 6 weeks.    We also spent some time talking through the importance of regular annual checks     I spent 43 minutes with patient reviewing test from [place], counseling patient, performing a physical exam, ordering labs, tests and referrals to specialist.    Follow Up   Return in about 6 weeks (around 11/23/2023), or if symptoms worsen or fail to improve, for Recheck HLD, needs fasting labs now when able .  Patient was given instructions and counseling regarding his condition or for health maintenance advice. Please see specific information pulled into the AVS if appropriate.     Julissa Krishnamurthy MD

## 2023-10-24 LAB
ALBUMIN SERPL-MCNC: 4.9 G/DL (ref 3.5–5.2)
ALBUMIN/GLOB SERPL: 2 G/DL
ALP SERPL-CCNC: 92 U/L (ref 39–117)
ALT SERPL-CCNC: 28 U/L (ref 1–41)
AST SERPL-CCNC: 24 U/L (ref 1–40)
BASOPHILS # BLD AUTO: 0.04 10*3/MM3 (ref 0–0.2)
BASOPHILS NFR BLD AUTO: 0.8 % (ref 0–1.5)
BILIRUB SERPL-MCNC: 1 MG/DL (ref 0–1.2)
BUN SERPL-MCNC: 18 MG/DL (ref 6–20)
BUN/CREAT SERPL: 23.1 (ref 7–25)
CALCIUM SERPL-MCNC: 9.3 MG/DL (ref 8.6–10.5)
CHLORIDE SERPL-SCNC: 104 MMOL/L (ref 98–107)
CHOLEST SERPL-MCNC: 183 MG/DL (ref 0–200)
CO2 SERPL-SCNC: 27.5 MMOL/L (ref 22–29)
CREAT SERPL-MCNC: 0.78 MG/DL (ref 0.76–1.27)
EGFRCR SERPLBLD CKD-EPI 2021: 110.7 ML/MIN/1.73
EOSINOPHIL # BLD AUTO: 0.12 10*3/MM3 (ref 0–0.4)
EOSINOPHIL NFR BLD AUTO: 2.4 % (ref 0.3–6.2)
ERYTHROCYTE [DISTWIDTH] IN BLOOD BY AUTOMATED COUNT: 13.5 % (ref 12.3–15.4)
GLOBULIN SER CALC-MCNC: 2.4 GM/DL
GLUCOSE SERPL-MCNC: 92 MG/DL (ref 65–99)
HCT VFR BLD AUTO: 44.6 % (ref 37.5–51)
HCV IGG SERPL QL IA: NON REACTIVE
HDLC SERPL-MCNC: 41 MG/DL (ref 40–60)
HGB BLD-MCNC: 15.1 G/DL (ref 13–17.7)
IMM GRANULOCYTES # BLD AUTO: 0.02 10*3/MM3 (ref 0–0.05)
IMM GRANULOCYTES NFR BLD AUTO: 0.4 % (ref 0–0.5)
LDLC SERPL CALC-MCNC: 106 MG/DL (ref 0–100)
LYMPHOCYTES # BLD AUTO: 1.49 10*3/MM3 (ref 0.7–3.1)
LYMPHOCYTES NFR BLD AUTO: 29.2 % (ref 19.6–45.3)
MCH RBC QN AUTO: 30.4 PG (ref 26.6–33)
MCHC RBC AUTO-ENTMCNC: 33.9 G/DL (ref 31.5–35.7)
MCV RBC AUTO: 89.7 FL (ref 79–97)
MONOCYTES # BLD AUTO: 0.3 10*3/MM3 (ref 0.1–0.9)
MONOCYTES NFR BLD AUTO: 5.9 % (ref 5–12)
NEUTROPHILS # BLD AUTO: 3.13 10*3/MM3 (ref 1.7–7)
NEUTROPHILS NFR BLD AUTO: 61.3 % (ref 42.7–76)
NRBC BLD AUTO-RTO: 0.2 /100 WBC (ref 0–0.2)
PLATELET # BLD AUTO: 178 10*3/MM3 (ref 140–450)
POTASSIUM SERPL-SCNC: 3.9 MMOL/L (ref 3.5–5.2)
PROT SERPL-MCNC: 7.3 G/DL (ref 6–8.5)
RBC # BLD AUTO: 4.97 10*6/MM3 (ref 4.14–5.8)
SODIUM SERPL-SCNC: 142 MMOL/L (ref 136–145)
TRIGL SERPL-MCNC: 206 MG/DL (ref 0–150)
TSH SERPL DL<=0.005 MIU/L-ACNC: 2.79 UIU/ML (ref 0.27–4.2)
VLDLC SERPL CALC-MCNC: 36 MG/DL (ref 5–40)
WBC # BLD AUTO: 5.1 10*3/MM3 (ref 3.4–10.8)

## 2023-10-27 ENCOUNTER — HOSPITAL ENCOUNTER (OUTPATIENT)
Dept: CARDIOLOGY | Facility: HOSPITAL | Age: 48
Discharge: HOME | End: 2023-10-27
Payer: COMMERCIAL

## 2023-10-27 VITALS
BODY MASS INDEX: 30.7 KG/M2 | WEIGHT: 191 LBS | SYSTOLIC BLOOD PRESSURE: 133 MMHG | HEIGHT: 66 IN | DIASTOLIC BLOOD PRESSURE: 80 MMHG

## 2023-10-27 DIAGNOSIS — E78.49 OTHER HYPERLIPIDEMIA: ICD-10-CM

## 2023-10-27 DIAGNOSIS — R42 DIZZY SPELLS: ICD-10-CM

## 2023-10-27 DIAGNOSIS — R94.31 ABNORMAL EKG: ICD-10-CM

## 2023-10-27 LAB
AORTIC ARCH: 2.6 CM
ASCENDING AORTA: 2.6 CM
BH CV ECHO MEAS - ACS: 1.82 CM
BH CV ECHO MEAS - AO MAX PG: 8.5 MMHG
BH CV ECHO MEAS - AO MEAN PG: 4.7 MMHG
BH CV ECHO MEAS - AO ROOT DIAM: 3.3 CM
BH CV ECHO MEAS - AO V2 MAX: 145.8 CM/SEC
BH CV ECHO MEAS - AO V2 VTI: 32.1 CM
BH CV ECHO MEAS - AVA(I,D): 1.84 CM2
BH CV ECHO MEAS - EDV(CUBED): 71.4 ML
BH CV ECHO MEAS - EDV(MOD-SP2): 57 ML
BH CV ECHO MEAS - EDV(MOD-SP4): 49 ML
BH CV ECHO MEAS - EF(MOD-BP): 61.5 %
BH CV ECHO MEAS - EF(MOD-SP2): 61.4 %
BH CV ECHO MEAS - EF(MOD-SP4): 63.3 %
BH CV ECHO MEAS - ESV(CUBED): 11.6 ML
BH CV ECHO MEAS - ESV(MOD-SP2): 22 ML
BH CV ECHO MEAS - ESV(MOD-SP4): 18 ML
BH CV ECHO MEAS - FS: 45.5 %
BH CV ECHO MEAS - IVS/LVPW: 0.86 CM
BH CV ECHO MEAS - IVSD: 0.71 CM
BH CV ECHO MEAS - LAT PEAK E' VEL: 15.6 CM/SEC
BH CV ECHO MEAS - LV MASS(C)D: 93.9 GRAMS
BH CV ECHO MEAS - LV MAX PG: 2.7 MMHG
BH CV ECHO MEAS - LV MEAN PG: 1.48 MMHG
BH CV ECHO MEAS - LV V1 MAX: 82.7 CM/SEC
BH CV ECHO MEAS - LV V1 VTI: 18 CM
BH CV ECHO MEAS - LVIDD: 4.1 CM
BH CV ECHO MEAS - LVIDS: 2.26 CM
BH CV ECHO MEAS - LVOT AREA: 3.3 CM2
BH CV ECHO MEAS - LVOT DIAM: 2.05 CM
BH CV ECHO MEAS - LVPWD: 0.82 CM
BH CV ECHO MEAS - MED PEAK E' VEL: 9.5 CM/SEC
BH CV ECHO MEAS - MV A DUR: 0.12 SEC
BH CV ECHO MEAS - MV A MAX VEL: 61.7 CM/SEC
BH CV ECHO MEAS - MV DEC SLOPE: 562.1 CM/SEC2
BH CV ECHO MEAS - MV DEC TIME: 0.16 SEC
BH CV ECHO MEAS - MV E MAX VEL: 73.7 CM/SEC
BH CV ECHO MEAS - MV E/A: 1.19
BH CV ECHO MEAS - MV MAX PG: 3 MMHG
BH CV ECHO MEAS - MV MEAN PG: 1.5 MMHG
BH CV ECHO MEAS - MV P1/2T: 45.3 MSEC
BH CV ECHO MEAS - MV V2 VTI: 18.9 CM
BH CV ECHO MEAS - MVA(P1/2T): 4.9 CM2
BH CV ECHO MEAS - MVA(VTI): 3.1 CM2
BH CV ECHO MEAS - PA ACC TIME: 0.14 SEC
BH CV ECHO MEAS - PA V2 MAX: 116.7 CM/SEC
BH CV ECHO MEAS - RV MAX PG: 2.8 MMHG
BH CV ECHO MEAS - RV V1 MAX: 83.6 CM/SEC
BH CV ECHO MEAS - RV V1 VTI: 15.3 CM
BH CV ECHO MEAS - SV(LVOT): 59.2 ML
BH CV ECHO MEAS - SV(MOD-SP2): 35 ML
BH CV ECHO MEAS - SV(MOD-SP4): 31 ML
BH CV ECHO MEAS - TAPSE (>1.6): 2.04 CM
BH CV ECHO MEASUREMENTS AVERAGE E/E' RATIO: 5.87
BH CV STRESS BP STAGE 1: NORMAL
BH CV STRESS BP STAGE 2: NORMAL
BH CV STRESS BP STAGE 3: NORMAL
BH CV STRESS BP STAGE 4: NORMAL
BH CV STRESS DURATION MIN STAGE 1: 3
BH CV STRESS DURATION MIN STAGE 2: 3
BH CV STRESS DURATION MIN STAGE 3: 3
BH CV STRESS DURATION MIN STAGE 4: 0
BH CV STRESS DURATION SEC STAGE 1: 0
BH CV STRESS DURATION SEC STAGE 2: 0
BH CV STRESS DURATION SEC STAGE 3: 0
BH CV STRESS DURATION SEC STAGE 4: 52
BH CV STRESS GRADE STAGE 1: 10
BH CV STRESS GRADE STAGE 2: 12
BH CV STRESS GRADE STAGE 3: 14
BH CV STRESS GRADE STAGE 4: 16
BH CV STRESS HR STAGE 1: 103
BH CV STRESS HR STAGE 2: 125
BH CV STRESS HR STAGE 3: 148
BH CV STRESS HR STAGE 4: 162
BH CV STRESS METS STAGE 1: 5
BH CV STRESS METS STAGE 2: 7.5
BH CV STRESS METS STAGE 3: 10
BH CV STRESS METS STAGE 4: 13.5
BH CV STRESS PROTOCOL 1: NORMAL
BH CV STRESS RECOVERY BP: NORMAL MMHG
BH CV STRESS RECOVERY HR: 90 BPM
BH CV STRESS SPEED STAGE 1: 1.7
BH CV STRESS SPEED STAGE 2: 2.5
BH CV STRESS SPEED STAGE 3: 3.4
BH CV STRESS SPEED STAGE 4: 4.2
BH CV STRESS STAGE 1: 1
BH CV STRESS STAGE 2: 2
BH CV STRESS STAGE 3: 3
BH CV STRESS STAGE 4: 4
BH CV XLRA - RV BASE: 3.4 CM
BH CV XLRA - RV LENGTH: 6.3 CM
BH CV XLRA - RV MID: 3 CM
BH CV XLRA - TDI S': 13.3 CM/SEC
LEFT ATRIUM VOLUME INDEX: 16.5 ML/M2
MAXIMAL PREDICTED HEART RATE: 173 BPM
PERCENT MAX PREDICTED HR: 93.64 %
SINUS: 2.7 CM
STJ: 2.6 CM
STRESS BASELINE BP: NORMAL MMHG
STRESS BASELINE HR: 70 BPM
STRESS PERCENT HR: 110 %
STRESS POST ESTIMATED WORKLOAD: 11.6 METS
STRESS POST EXERCISE DUR MIN: 9 MIN
STRESS POST EXERCISE DUR SEC: 53 SEC
STRESS POST PEAK BP: NORMAL MMHG
STRESS POST PEAK HR: 162 BPM
STRESS TARGET HR: 147 BPM

## 2023-10-27 PROCEDURE — 93017 CV STRESS TEST TRACING ONLY: CPT

## 2023-10-27 PROCEDURE — 93306 TTE W/DOPPLER COMPLETE: CPT

## 2023-10-31 ENCOUNTER — OFFICE VISIT (OUTPATIENT)
Age: 48
End: 2023-10-31
Payer: COMMERCIAL

## 2023-10-31 VITALS
HEIGHT: 66 IN | HEART RATE: 69 BPM | OXYGEN SATURATION: 99 % | WEIGHT: 197 LBS | BODY MASS INDEX: 31.66 KG/M2 | DIASTOLIC BLOOD PRESSURE: 80 MMHG | SYSTOLIC BLOOD PRESSURE: 120 MMHG

## 2023-10-31 DIAGNOSIS — R00.2 PALPITATIONS: Primary | ICD-10-CM

## 2023-10-31 PROCEDURE — 93000 ELECTROCARDIOGRAM COMPLETE: CPT | Performed by: STUDENT IN AN ORGANIZED HEALTH CARE EDUCATION/TRAINING PROGRAM

## 2023-10-31 PROCEDURE — 99204 OFFICE O/P NEW MOD 45 MIN: CPT | Performed by: STUDENT IN AN ORGANIZED HEALTH CARE EDUCATION/TRAINING PROGRAM

## 2023-10-31 NOTE — PROGRESS NOTES
Subjective:     Encounter Date:10/31/2023      Patient ID: Everton Barros is a 48 y.o. male.    Chief Complaint:  Ear fullness, palpitations    HPI:   48 y.o. male hyperlipidemia who presents for further evaluation of ear fullness and palpitations.  Patient notes that a couple of weeks ago he had an episode of severe ear fullness after completing an hour of exercise.  This is happened multiple times since.  He went to his PMD for further evaluation and was noted to have inferolateral Q waves on EKG.  As a result he underwent echocardiogram which revealed normal EF and normal diastolic function.  He also had an exercise stress test and completed 9 minutes and this was negative for ischemia.  Patient's symptoms have continued.  He also reports intermittent palpitations.  These mostly occur at night or early in the morning.  There are no associated symptoms.    The following portions of the patient's history were reviewed and updated as appropriate: allergies, current medications, past family history, past medical history, past social history, past surgical history and problem list.     REVIEW OF SYSTEMS:   All systems reviewed.  Pertinent positives identified in HPI.  All other systems are negative.    Past Medical History:   Diagnosis Date    Dermatitis     Hyperlipidemia        Family History   Problem Relation Age of Onset    No Known Problems Mother     Hypertension Father         Exercise     Diabetes Father     No Known Problems Sister     No Known Problems Brother     No Known Problems Sister     Cancer Paternal Grandmother     Heart attack Neg Hx     Stroke Neg Hx        Social History     Socioeconomic History    Marital status:    Tobacco Use    Smoking status: Never    Smokeless tobacco: Never   Vaping Use    Vaping Use: Never used   Substance and Sexual Activity    Alcohol use: Yes     Comment: occ    Drug use: No    Sexual activity: Yes     Partners: Female     Comment:          Allergies   Allergen Reactions    Ketorolac GI Intolerance       History reviewed. No pertinent surgical history.      ECG 12 Lead    Date/Time: 10/31/2023 3:38 PM  Performed by: Zachery Ovalles MD    Authorized by: Zachery Ovalles MD  Comparison: compared with previous ECG from 8/3/2023  Similar to previous ECG  Rhythm: sinus rhythm  Rate: normal  ST Elevation: II, III, aVF, V5 and V6  QRS axis: normal    Clinical impression: abnormal EKG             Objective:         Vitals:    10/31/23 1439   BP: 120/80   Pulse: 69   SpO2: 99%       PHYSICAL EXAM:  GEN: well appearing, in NAD   HEENT: NCAT, EOMI, moist mucus membranes   Respiratory: CTAB, no wheezes, rales or rhonchi  CV: normal rate, regular rhythm, normal S1, S2, no murmurs, rubs, gallops, +2 radial pulses b/l  GI: soft, nontender, nondistended  MSK: no edema  Skin: no rash, warm, dry  Heme/Lymph: no bruising or bleeding  Neuro: Alert and Oriented x 3, grossly normal motor function        Assessment:         (R00.2) Palpitations - Plan: Holter Monitor - 72 Hour Up To 15 Days    48 y.o. male hyperlipidemia who presents for further evaluation of ear fullness and palpitations.         Plan:       #Palpitations/ear fullness  Normal echocardiogram, normal exercise stress test.  He is able to exercise and is able to manage in the activities.  It is likely that his symptoms are not cardiac in etiology however given his palpitations will evaluate further with a Holter monitor.  - 2-week Zio patch for further evaluation    Dr. Krishnamurthy, thank you very much for referring this kind patient to me. Please call me with any questions or concerns. I will see the patient again in the office pending test results         Zachery Ovalles MD  10/31/23  [facility]    No outpatient encounter medications on file as of 10/31/2023.     No facility-administered encounter medications on file as of 10/31/2023.

## 2023-11-27 ENCOUNTER — OFFICE VISIT (OUTPATIENT)
Dept: SLEEP MEDICINE | Facility: HOSPITAL | Age: 48
End: 2023-11-27
Payer: COMMERCIAL

## 2023-11-27 VITALS
WEIGHT: 192.4 LBS | OXYGEN SATURATION: 99 % | HEART RATE: 76 BPM | BODY MASS INDEX: 30.92 KG/M2 | HEIGHT: 66 IN | DIASTOLIC BLOOD PRESSURE: 83 MMHG | SYSTOLIC BLOOD PRESSURE: 140 MMHG

## 2023-11-27 DIAGNOSIS — G47.33 OSA (OBSTRUCTIVE SLEEP APNEA): ICD-10-CM

## 2023-11-27 DIAGNOSIS — R42 DIZZY SPELLS: Primary | ICD-10-CM

## 2023-11-27 DIAGNOSIS — R06.83 SNORING: ICD-10-CM

## 2023-11-27 DIAGNOSIS — G47.10 HYPERSOMNIA: ICD-10-CM

## 2023-11-27 PROCEDURE — 99204 OFFICE O/P NEW MOD 45 MIN: CPT | Performed by: PSYCHIATRY & NEUROLOGY

## 2023-11-27 PROCEDURE — G0463 HOSPITAL OUTPT CLINIC VISIT: HCPCS

## 2023-11-27 NOTE — PROGRESS NOTES
"  Reason for Consultation: Sleep apnea        Patient Care Team:  Julissa Krishnamurthy MD as PCP - General (Family Medicine)  Aislinn Villa MD, MPH as Consulting Physician (Sleep Medicine)      History of present illness:    Thank you for asking me to see your patient.  The patient is a 48 y.o. male has been reported to snore loudly for many years.  His wife was breathing does not currently complain that he stops breathing.  He got a Z quiet mandibular advancement device on P2i and he thinks that the snoring is better.  He said this device seem to work better initially than it does now.  He still is sleepy during the day and is interested in having a sleep study done.  The software associate with is he quiet seems to show that his snoring is better maybe?    He is interested in doing a sleep study without the mandibular advancement device because he thinks that it is not working very well.  He also has a history of bruxism.  Habitual bedtime is 11 PM during the week and gets up at 7 AM and he is a little tired in the morning.  He goes to bed about midnight on the weekends and gets up about 9 AM.  And he still tired.  He does complain of morning headaches and has some leg jerking movements.  He has no other additional complaints in the review of systems.  He works as an .    [place]: 4    Data Reviewed: Reviewed his questionnaire.      PMH:  Past Medical History:   Diagnosis Date    Dermatitis     Hyperlipidemia           Allergies:  Ketorolac     Medication Review:   No current outpatient medications on file prior to visit.     No current facility-administered medications on file prior to visit.         Vital Signs:    Vitals:    11/27/23 1328   BP: 140/83   Pulse: 76   SpO2: 99%   Weight: 87.3 kg (192 lb 6.4 oz)   Height: 167.6 cm (66\")        Body mass index is 31.05 kg/m².  Neck Circumference: 14.5 inches      Physical Exam:    Constitutional:  Well developed 48 y.o. male that appears in no apparent " distress.  Awake & oriented times 3.  Normal mood with normal recent and remote memory and normal judgement.  Eyes:  Conjunctivae normal.  Oropharynx: Moist mucous membranes without exudate and Mallampati 3  Neck: Trachea midline  Respiratory: Effort is not labored  Cardiovascular: Radial pulse regular  Musculoskeletal: Gait appears normal, no digital clubbing evident, no pre-tibial edema        Impression:   Encounter Diagnoses   Name Primary?    Dizzy spells Yes    Hypersomnia     Snoring     ROD (obstructive sleep apnea)      Obesity class I patient's BMI is Body mass index is 31.05 kg/m².    Patient is interested in doing a home sleep apnea test and I think that is appropriate as he has no contraindications.  He is elected to not wear the over-the-counter mandibular advancement device and I think that is entirely reasonable.  I explained to him that if he has moderately severe or worse sleep apnea then I would advocate for CPAP unless his sleep disordered breathing and strongly positional.    Plan:  The patient should practice good sleep hygiene measures.      Weight loss might be beneficial in this patient who has a Body mass index is 31.05 kg/m².      Pathophysiology of ROD described to the patient.  Cardiovascular complications of untreated ROD also reviewed.      The patient was cautioned about the dangers of drowsy driving.    Dinh Villa MD  Sleep Medicine  11/27/23  13:58 EST

## 2023-11-28 ENCOUNTER — OFFICE VISIT (OUTPATIENT)
Dept: FAMILY MEDICINE CLINIC | Facility: CLINIC | Age: 48
End: 2023-11-28
Payer: COMMERCIAL

## 2023-11-28 VITALS
TEMPERATURE: 97.8 F | HEART RATE: 78 BPM | WEIGHT: 195 LBS | OXYGEN SATURATION: 98 % | SYSTOLIC BLOOD PRESSURE: 118 MMHG | BODY MASS INDEX: 31.34 KG/M2 | HEIGHT: 66 IN | DIASTOLIC BLOOD PRESSURE: 72 MMHG

## 2023-11-28 DIAGNOSIS — E78.2 ELEVATED CHOLESTEROL WITH ELEVATED TRIGLYCERIDES: Primary | ICD-10-CM

## 2023-11-28 DIAGNOSIS — Z23 NEED FOR VACCINATION: ICD-10-CM

## 2023-11-28 DIAGNOSIS — R03.0 ELEVATED BLOOD PRESSURE READING IN OFFICE WITHOUT DIAGNOSIS OF HYPERTENSION: ICD-10-CM

## 2023-11-28 DIAGNOSIS — M25.562 ACUTE PAIN OF LEFT KNEE: ICD-10-CM

## 2023-11-28 DIAGNOSIS — H93.8X3 EAR PRESSURE, BILATERAL: ICD-10-CM

## 2023-11-28 NOTE — PROGRESS NOTES
"Chief Complaint  Knee Pain (Right knee injured after working out   for about a week ) and Hypertension    Subjective        Everton Barros presents to [facility]  History of Present Illness  Pleasant 48-year-old male here to follow-up after his last visit.  There was a lot of things that we ordered and did.  He has seen both cardiology and sleep medicine. Updates from last visit, has sleep study pending, had full cardiac work-up, normal echo and stress test, Holter monitor resulted yesterday pending.  Labs are overall reassuring aside from elevated triglycerides.    He still has ear congestion-the vertigo and dizziness has resolved but has pressure in both of his ears    He was doing exercise and started having knee pain, medial, he feels that he heard something, its a 2 out of 10 pain, does not impact his rest but does impact his ability to exercise and walk.    Objective   Vital Signs:  /72   Pulse 78   Temp 97.8 °F (36.6 °C)   Ht 167.6 cm (66\")   Wt 88.5 kg (195 lb)   SpO2 98%   BMI 31.47 kg/m²   Estimated body mass index is 31.47 kg/m² as calculated from the following:    Height as of this encounter: 167.6 cm (66\").    Weight as of this encounter: 88.5 kg (195 lb).       Physical Exam  Vitals and nursing note reviewed.   Constitutional:       General: He is not in acute distress.     Appearance: He is well-developed.   HENT:      Head: Normocephalic.      Right Ear: Tympanic membrane, ear canal and external ear normal. There is no impacted cerumen.      Left Ear: Tympanic membrane, ear canal and external ear normal. There is no impacted cerumen.      Nose: Nose normal.   Cardiovascular:      Rate and Rhythm: Normal rate and regular rhythm.      Heart sounds: Normal heart sounds. No murmur heard.  Pulmonary:      Effort: Pulmonary effort is normal. No respiratory distress.      Breath sounds: Normal breath sounds.   Musculoskeletal:         General: Normal " range of motion.   Skin:     General: Skin is warm and dry.      Findings: No rash.   Neurological:      Mental Status: He is alert and oriented to person, place, and time.   Psychiatric:         Behavior: Behavior normal.         Thought Content: Thought content normal.         Judgment: Judgment normal.        Result Review :                   Assessment and Plan   Diagnoses and all orders for this visit:    1. Elevated cholesterol with elevated triglycerides (Primary)  Comments:  We discussed decreasing saturated fats increasing fiber and regular exercise.  We will reassess in 6 months.  Orders:  -     Comprehensive Metabolic Panel; Future  -     CBC & Differential; Future  -     Lipid Panel; Future    2. Need for vaccination  -     Fluzone >6 Months (5800-7822)    3. Acute pain of left knee    4. Ear pressure, bilateral  Comments:  Normal ear exam, recommended for OTC allergy meds, normal saline and Flonase, possibly guaifenesin.  Follow-up if not improving or see ENT.    5. Elevated blood pressure reading in office without diagnosis of hypertension  Comments:  Much improved readings especially with implementing more regular exercise.  Goal blood pressure less than 130/90.    Continue follow-up with cardiology and sleep medicine as he is now.         Follow Up   Return in about 6 months (around 5/28/2024), or if symptoms worsen or fail to improve, for Annual Physical with fasting labs prior.  Patient was given instructions and counseling regarding his condition or for health maintenance advice. Please see specific information pulled into the AVS if appropriate.     Julissa Krishnamurthy MD

## 2023-12-12 ENCOUNTER — HOSPITAL ENCOUNTER (OUTPATIENT)
Dept: SLEEP MEDICINE | Facility: HOSPITAL | Age: 48
Discharge: HOME | End: 2023-12-12
Admitting: PSYCHIATRY & NEUROLOGY
Payer: COMMERCIAL

## 2023-12-12 DIAGNOSIS — G47.10 HYPERSOMNIA: ICD-10-CM

## 2023-12-12 DIAGNOSIS — R42 DIZZY SPELLS: ICD-10-CM

## 2023-12-12 DIAGNOSIS — G47.33 OSA (OBSTRUCTIVE SLEEP APNEA): ICD-10-CM

## 2023-12-12 DIAGNOSIS — R06.83 SNORING: ICD-10-CM

## 2023-12-12 PROCEDURE — 95806 SLEEP STUDY UNATT&RESP EFFT: CPT

## 2023-12-21 DIAGNOSIS — G47.10 HYPERSOMNIA: ICD-10-CM

## 2023-12-21 DIAGNOSIS — G47.33 OSA (OBSTRUCTIVE SLEEP APNEA): Primary | ICD-10-CM

## 2023-12-27 ENCOUNTER — TELEPHONE (OUTPATIENT)
Dept: SLEEP MEDICINE | Facility: HOSPITAL | Age: 48
End: 2023-12-27
Payer: COMMERCIAL

## 2024-03-21 ENCOUNTER — OFFICE VISIT (OUTPATIENT)
Dept: SLEEP MEDICINE | Facility: HOSPITAL | Age: 49
End: 2024-03-21
Payer: COMMERCIAL

## 2024-03-21 VITALS — BODY MASS INDEX: 31.23 KG/M2 | HEART RATE: 66 BPM | HEIGHT: 67 IN | WEIGHT: 199 LBS | OXYGEN SATURATION: 97 %

## 2024-03-21 DIAGNOSIS — E66.3 OVERWEIGHT (BMI 25.0-29.9): ICD-10-CM

## 2024-03-21 DIAGNOSIS — G47.33 OSA ON CPAP: Primary | ICD-10-CM

## 2024-03-21 PROCEDURE — G0463 HOSPITAL OUTPT CLINIC VISIT: HCPCS

## 2024-03-21 NOTE — PROGRESS NOTES
Follow Up Sleep Disorders Center Note     Chief Complaint: Apnea on CPAP    Primary Care Physician: Julissa Krishnamurthy MD    Interval History:   The patient is a 48 y.o. male  who returns for compliance follow-up.  He thinks he is feeling better and he is wife says he is snoring less.  Hybrid mask but complains that when he is lying on his side he has more leak than he would like.    [facility] Sleep Test Report 12/12/2023     FelixConde [place]  1975  [id number]        Interpreting Physician: Dinh Villa MD     Referring Physician:  Dinh Villa MD     Primary Care Physician: Julissa Krishnamurthy MD     Clinical Information: Patient is a 48 y.o. male       The patient's BMI: 31.5           Methods: Study performed based on the standardized protocol.     Home Sleep Study Findings:   Recording start time 11:26 PM and recording end time the next morning 6:32 AM. Total recording time 426.2 minutes and monitoring time 245.5 minutes.      The patient had 5 obstructive events and 26 partial obstructive events. AHI/LULI for total monitoring time is 7.6. The patient's sleep disordered breathing was positional.  The patient's LULI while supine was 16.3, 0 on the left side and 0 on the right side.     Lowest oxygen saturation was 86.     Mean heart rate is 62.4 with a high heart rate 92 and a low heart rate of 49 bpm.     176 total snoring episodes noted and percent time snoring 14.5%.     Impression:      Mild, positional sleep apnea.  While supine the patient's sleep apnea is actually moderately severe.            Downloaded PAP Data Evaluated For Therapeutic Response and Compliance:  DME is [employer].  Downloads between 2/20 and 3/20/2024.  Average usage is 8% of the past 30 days and for 4 hours and 59 minutes.  Average AHI is 1.9.  Average auto CPAP pressure is 6.9 with 95th percentile being 10.1 cm water pressure    Review of Systems:    A complete review of systems was done and all were  "negative with the exception of mask leak when he is lying on his side    Social History:    Social History     Socioeconomic History    Marital status:    Tobacco Use    Smoking status: Never    Smokeless tobacco: Never   Vaping Use    Vaping status: Never Used   Substance and Sexual Activity    Alcohol use: Yes     Comment: occ    Drug use: No    Sexual activity: Yes     Partners: Female     Comment:         Allergies:  Ketorolac     Medication Review:  Reviewed.      Vital Signs:    Vitals:    03/21/24 1436   Pulse: 66   SpO2: 97%   Weight: 90.3 kg (199 lb)   Height: 170.2 cm (67\")     Body mass index is 31.17 kg/m².    Physical Exam:    Constitutional:  Well developed 48 y.o. male that appears in no apparent distress.  Awake & oriented times 3.  Normal mood with normal recent and remote memory and normal judgement.  Eyes:  Conjunctivae normal.      Self-administered [place] Sleepiness Scale test results:  8  0-5 Lower normal daytime sleepiness  6-10 Higher normal daytime sleepiness  11-12 Mild, 13-15 Moderate, & 16-24 Severe excessive daytime sleepiness       I have reviewed the above results and compared them with the patient's last downloads and reviewed with the patient.    Impression:     Encounter Diagnoses   Name Primary?    ROD on CPAP Yes    Overweight (BMI 25.0-29.9)          Plan:  Good sleep hygiene measures should be maintained.  Weight loss would be beneficial in this patient who obesity class I by Body mass index is 31.17 kg/m².      After evaluating the patient and assessing results available, the patient is benefiting from the treatment being provided.     The patient will continue auto CPAP.  Potential side effects of CPAP therapy reviewed and addressed as needed.  After clinical evaluation and review of downloads, I recommend no changes to the patient's pressures.      I answered all of the patient's questions.  The patient will call the Sleep Disorder Center for any problems and " will follow up in 1 year.      Dinh Villa MD  Sleep Medicine  03/21/24  14:37 EDT

## 2024-09-03 ENCOUNTER — TELEPHONE (OUTPATIENT)
Dept: FAMILY MEDICINE CLINIC | Facility: CLINIC | Age: 49
End: 2024-09-03
Payer: COMMERCIAL

## 2024-09-03 DIAGNOSIS — E66.3 OVERWEIGHT (BMI 25.0-29.9): ICD-10-CM

## 2024-09-03 DIAGNOSIS — Z13.0 SCREENING FOR DEFICIENCY ANEMIA: ICD-10-CM

## 2024-09-03 DIAGNOSIS — E78.2 ELEVATED CHOLESTEROL WITH ELEVATED TRIGLYCERIDES: Primary | ICD-10-CM

## 2024-09-03 DIAGNOSIS — E78.49 OTHER HYPERLIPIDEMIA: ICD-10-CM

## 2024-09-03 DIAGNOSIS — Z13.29 THYROID DISORDER SCREENING: ICD-10-CM

## 2024-09-06 LAB
ALBUMIN SERPL-MCNC: 4.4 G/DL (ref 3.5–5.2)
ALBUMIN/GLOB SERPL: 1.7 G/DL
ALP SERPL-CCNC: 105 U/L (ref 39–117)
ALT SERPL-CCNC: 28 U/L (ref 1–41)
AST SERPL-CCNC: 20 U/L (ref 1–40)
BASOPHILS # BLD AUTO: 0.06 10*3/MM3 (ref 0–0.2)
BASOPHILS NFR BLD AUTO: 1.2 % (ref 0–1.5)
BILIRUB SERPL-MCNC: 0.4 MG/DL (ref 0–1.2)
BUN SERPL-MCNC: 15 MG/DL (ref 6–20)
BUN/CREAT SERPL: 21.1 (ref 7–25)
CALCIUM SERPL-MCNC: 9.2 MG/DL (ref 8.6–10.5)
CHLORIDE SERPL-SCNC: 104 MMOL/L (ref 98–107)
CHOLEST SERPL-MCNC: 182 MG/DL (ref 0–200)
CO2 SERPL-SCNC: 26.2 MMOL/L (ref 22–29)
CREAT SERPL-MCNC: 0.71 MG/DL (ref 0.76–1.27)
EGFRCR SERPLBLD CKD-EPI 2021: 113.2 ML/MIN/1.73
EOSINOPHIL # BLD AUTO: 0.15 10*3/MM3 (ref 0–0.4)
EOSINOPHIL NFR BLD AUTO: 2.9 % (ref 0.3–6.2)
ERYTHROCYTE [DISTWIDTH] IN BLOOD BY AUTOMATED COUNT: 13 % (ref 12.3–15.4)
GLOBULIN SER CALC-MCNC: 2.6 GM/DL
GLUCOSE SERPL-MCNC: 92 MG/DL (ref 65–99)
HCT VFR BLD AUTO: 44 % (ref 37.5–51)
HDLC SERPL-MCNC: 36 MG/DL (ref 40–60)
HGB BLD-MCNC: 14.8 G/DL (ref 13–17.7)
IMM GRANULOCYTES # BLD AUTO: 0.02 10*3/MM3 (ref 0–0.05)
IMM GRANULOCYTES NFR BLD AUTO: 0.4 % (ref 0–0.5)
LDLC SERPL CALC-MCNC: 104 MG/DL (ref 0–100)
LYMPHOCYTES # BLD AUTO: 1.8 10*3/MM3 (ref 0.7–3.1)
LYMPHOCYTES NFR BLD AUTO: 34.6 % (ref 19.6–45.3)
MCH RBC QN AUTO: 30.5 PG (ref 26.6–33)
MCHC RBC AUTO-ENTMCNC: 33.6 G/DL (ref 31.5–35.7)
MCV RBC AUTO: 90.5 FL (ref 79–97)
MONOCYTES # BLD AUTO: 0.31 10*3/MM3 (ref 0.1–0.9)
MONOCYTES NFR BLD AUTO: 6 % (ref 5–12)
NEUTROPHILS # BLD AUTO: 2.86 10*3/MM3 (ref 1.7–7)
NEUTROPHILS NFR BLD AUTO: 54.9 % (ref 42.7–76)
NRBC BLD AUTO-RTO: 0 /100 WBC (ref 0–0.2)
PLATELET # BLD AUTO: 166 10*3/MM3 (ref 140–450)
POTASSIUM SERPL-SCNC: 3.9 MMOL/L (ref 3.5–5.2)
PROT SERPL-MCNC: 7 G/DL (ref 6–8.5)
RBC # BLD AUTO: 4.86 10*6/MM3 (ref 4.14–5.8)
SODIUM SERPL-SCNC: 139 MMOL/L (ref 136–145)
TRIGL SERPL-MCNC: 247 MG/DL (ref 0–150)
TSH SERPL DL<=0.005 MIU/L-ACNC: 2.72 UIU/ML (ref 0.27–4.2)
VLDLC SERPL CALC-MCNC: 42 MG/DL (ref 5–40)
WBC # BLD AUTO: 5.2 10*3/MM3 (ref 3.4–10.8)

## 2024-09-10 ENCOUNTER — OFFICE VISIT (OUTPATIENT)
Dept: FAMILY MEDICINE CLINIC | Facility: CLINIC | Age: 49
End: 2024-09-10
Payer: COMMERCIAL

## 2024-09-10 VITALS
SYSTOLIC BLOOD PRESSURE: 126 MMHG | HEIGHT: 67 IN | OXYGEN SATURATION: 98 % | HEART RATE: 78 BPM | DIASTOLIC BLOOD PRESSURE: 82 MMHG | TEMPERATURE: 97.6 F | WEIGHT: 202 LBS | BODY MASS INDEX: 31.71 KG/M2

## 2024-09-10 DIAGNOSIS — E29.1 HYPOGONADISM IN MALE: ICD-10-CM

## 2024-09-10 DIAGNOSIS — Z00.00 HEALTH MAINTENANCE EXAMINATION: Primary | ICD-10-CM

## 2024-09-10 DIAGNOSIS — J34.9 SINUS DISEASE: ICD-10-CM

## 2024-09-10 DIAGNOSIS — L65.9 ALOPECIA: ICD-10-CM

## 2024-09-10 PROCEDURE — 99396 PREV VISIT EST AGE 40-64: CPT | Performed by: FAMILY MEDICINE

## 2024-09-10 PROCEDURE — 99213 OFFICE O/P EST LOW 20 MIN: CPT | Performed by: FAMILY MEDICINE

## 2024-09-10 RX ORDER — MINOXIDIL 2.5 MG/1
2.5 TABLET ORAL DAILY
COMMUNITY

## 2024-09-10 RX ORDER — DUTASTERIDE 0.5 MG/1
0.5 CAPSULE, LIQUID FILLED ORAL DAILY
COMMUNITY

## 2024-09-10 NOTE — PROGRESS NOTES
Chief Complaint  Migraine (Issues sleeping and migraines almost every day  ,bp seems to be fine had labs  done prior  now has a little facial pain   and probably sinus headaches  sometimes  blurry vision  ) and Annual Exam (Had labs done prior,  losing hair very fast and  went  to a doctor in [place] for hair loss  and  taking some medication  for this buts is afraid may affect testosterone  ,gaining weight )    Subjective        Everton Barros presents to [facility]  Migraine    Annual Exam.  Encounter performed in [place]ian, patient declined , both provider and medical assistant fluent [place]ian.    Health Maintenance   Topic Date Due    ANNUAL PHYSICAL  02/08/2020    COVID-19 Vaccine (1 - 2023-24 season) Never done    INFLUENZA VACCINE  08/01/2024    BMI FOLLOWUP  10/12/2024    LIPID PANEL  09/05/2025    COLORECTAL CANCER SCREENING  12/15/2026    TDAP/TD VACCINES (2 - Td or Tdap) 04/10/2033    HEPATITIS C SCREENING  Completed    Pneumococcal Vaccine 0-64  Aged Out       Diet: eating better , but not losing weight,.   Exercise: he is lifting weight and exercises  Immunizations: UTD   Colon cancer screening: UTD   Sleep/mental health: doing well  Dentist: UTD  Vision: He as had 3 months of blurry vision - he went     Headaches: They have been occurring almost on a daily basis, symptoms of sinus pain, wraps around his scabs, some blurry vision.  He has been doing a sinus rent and that has been help and has been present for 1 month with blocked ears     Alopecia: Taking minoxidil 2.5 mg y dutasterida.  From [place], he does think it helped he was wondering if it would impact his testosterone as well.  Wondering if there is other options to help with thinning of the hair.    He also had a testosterone tested which was low, he does have decreased energy, difficulty with losing weight despite being more strict with his diet and exercising, he is feeling frustrated in  "general, Test done on [place] which showed low testosterone.  However he does not remember the exact number.    He has had some ball on his R axillary region and a ball on his arm nervousness a sister history from breast cancer earlier this year.    Objective   Vital Signs:  /82   Pulse 78   Temp 97.6 °F (36.4 °C)   Ht 170.2 cm (67\")   Wt 91.6 kg (202 lb)   SpO2 98%   BMI 31.64 kg/m²   Estimated body mass index is 31.64 kg/m² as calculated from the following:    Height as of this encounter: 170.2 cm (67\").    Weight as of this encounter: 91.6 kg (202 lb).            Physical Exam  Vitals and nursing note reviewed.   Constitutional:       General: He is not in acute distress.     Appearance: Normal appearance. He is well-developed.   HENT:      Head: Normocephalic.      Right Ear: Tympanic membrane and ear canal normal.      Left Ear: Tympanic membrane and ear canal normal.      Ears:      Comments: Sinus tenderness noted above the left eyebrow, normal head  exam otherwise.     Nose: Nose normal.   Cardiovascular:      Rate and Rhythm: Normal rate and regular rhythm.      Heart sounds: Normal heart sounds. No murmur heard.  Pulmonary:      Effort: Pulmonary effort is normal. No respiratory distress.      Breath sounds: Normal breath sounds.   Abdominal:      General: Abdomen is flat. There is no distension.      Palpations: Abdomen is soft.      Tenderness: There is no abdominal tenderness.   Musculoskeletal:         General: Normal range of motion.      Comments: Normal axillary exam no lymphadenopathy found, small cyst on the right forearm that is also not concerning.  Soft, mobile.   Skin:     General: Skin is warm and dry.      Findings: No rash.   Neurological:      Mental Status: He is alert and oriented to person, place, and time.   Psychiatric:         Mood and Affect: Mood normal.         Behavior: Behavior normal.         Thought Content: Thought content normal.         Judgment: Judgment normal. "        Result Review :  The following data was reviewed by: Julissa Krishnamurthy MD on 09/10/2024:         TSH Rfx On Abnormal To Free T4 (09/05/2024 14:08)  Lipid Panel (09/05/2024 14:08)  CBC & Differential (09/05/2024 14:08)  Comprehensive Metabolic Panel (09/05/2024 14:08)     Assessment and Plan   Diagnoses and all orders for this visit:    1. Health maintenance examination (Primary)    2. Alopecia  -     Ambulatory Referral to Dermatology    3. Hypogonadism in male  -     Testosterone (Free & Total), LC / MS    4. Sinus disease  -     amoxicillin-clavulanate (AUGMENTIN) 875-125 MG per tablet; Take 1 tablet by mouth 2 (Two) Times a Day for 7 days.  Dispense: 14 tablet; Refill: 0    Here for annual exam, fasting labs reviewed with patient, immunizations up-to-date, colon cancer screening up-to-date, cardiovascular screening negative for symptoms, counseled patient to increase vegetable intake, water and 150 minutes of exercise weekly combining weightbearing exercises and aerobic activity.    When out of some observed in [place] will obtain fasting testosterone before 9 AM, follow-up if levels are below 300    Treat sinusitis with Augmentin, follow-up if not improving    Outpatient refer to dermatology, he may be candidate for topical minoxidil again or other options.       Follow Up   Return in 1 year (on 9/10/2025), or if symptoms worsen or fail to improve, for Annual Physical with fasting labs prior.  Patient was given instructions and counseling regarding his condition or for health maintenance advice. Please see specific information pulled into the AVS if appropriate.

## 2024-09-24 LAB
TESTOST FREE SERPL-MCNC: 6.7 PG/ML (ref 6.8–21.5)
TESTOST SERPL-MCNC: 228.7 NG/DL (ref 264–916)

## 2024-10-04 ENCOUNTER — OFFICE VISIT (OUTPATIENT)
Dept: FAMILY MEDICINE CLINIC | Facility: CLINIC | Age: 49
End: 2024-10-04
Payer: COMMERCIAL

## 2024-10-04 VITALS
HEART RATE: 71 BPM | HEIGHT: 67 IN | DIASTOLIC BLOOD PRESSURE: 82 MMHG | OXYGEN SATURATION: 99 % | SYSTOLIC BLOOD PRESSURE: 118 MMHG | BODY MASS INDEX: 31.67 KG/M2 | TEMPERATURE: 97.1 F | WEIGHT: 201.8 LBS

## 2024-10-04 DIAGNOSIS — E66.811 CLASS 1 OBESITY DUE TO EXCESS CALORIES WITHOUT SERIOUS COMORBIDITY WITH BODY MASS INDEX (BMI) OF 31.0 TO 31.9 IN ADULT: ICD-10-CM

## 2024-10-04 DIAGNOSIS — E66.09 CLASS 1 OBESITY DUE TO EXCESS CALORIES WITHOUT SERIOUS COMORBIDITY WITH BODY MASS INDEX (BMI) OF 31.0 TO 31.9 IN ADULT: ICD-10-CM

## 2024-10-04 DIAGNOSIS — E29.1 HYPOGONADISM IN MALE: Primary | ICD-10-CM

## 2024-10-04 DIAGNOSIS — Z23 NEED FOR HEPATITIS VACCINATION: ICD-10-CM

## 2024-10-04 PROCEDURE — 90656 IIV3 VACC NO PRSV 0.5 ML IM: CPT | Performed by: FAMILY MEDICINE

## 2024-10-04 PROCEDURE — 90471 IMMUNIZATION ADMIN: CPT | Performed by: FAMILY MEDICINE

## 2024-10-04 PROCEDURE — 99214 OFFICE O/P EST MOD 30 MIN: CPT | Performed by: FAMILY MEDICINE

## 2024-10-04 RX ORDER — PHENTERMINE HYDROCHLORIDE 15 MG/1
15 CAPSULE ORAL EVERY MORNING
Qty: 30 CAPSULE | Refills: 2 | Status: SHIPPED | OUTPATIENT
Start: 2024-10-04

## 2024-10-04 RX ORDER — TOPIRAMATE 25 MG/1
25 TABLET, FILM COATED ORAL 2 TIMES DAILY
Qty: 60 TABLET | Refills: 2 | Status: SHIPPED | OUTPATIENT
Start: 2024-10-04

## 2024-10-04 NOTE — PROGRESS NOTES
"Chief Complaint  Hypogonadism (Follow up low testosterone  test results )    Subjective        Everton Barros presents to [facility]  History of Present Illness  Pleasant 49-year-old male here to follow-up for hypogonadism that is not well-controlled, seen on recent labs.  Encounter performed in [place].  He does have symptoms of fatigue, decreased energy, difficulty with weight loss.     He had an apt with an endocrinologist with [place], recommendations to get pituitary evaluation as well, we discussed possibilities of starting testosterone but also risks with relationship to therapy.  In general he is hesitant to start testosterone, we spent 3 various options for weight loss.    Objective   Vital Signs:  /82   Pulse 71   Temp 97.1 °F (36.2 °C)   Ht 170.2 cm (67\")   Wt 91.5 kg (201 lb 12.8 oz)   SpO2 99%   BMI 31.61 kg/m²   Estimated body mass index is 31.61 kg/m² as calculated from the following:    Height as of this encounter: 170.2 cm (67\").    Weight as of this encounter: 91.5 kg (201 lb 12.8 oz).         Physical Exam  Vitals and nursing note reviewed.   Constitutional:       General: He is not in acute distress.     Appearance: He is well-developed.   HENT:      Head: Normocephalic.      Nose: Nose normal.   Cardiovascular:      Rate and Rhythm: Normal rate and regular rhythm.      Heart sounds: Normal heart sounds. No murmur heard.  Pulmonary:      Effort: Pulmonary effort is normal. No respiratory distress.      Breath sounds: Normal breath sounds.   Musculoskeletal:         General: Normal range of motion.   Skin:     General: Skin is warm and dry.      Findings: No rash.   Neurological:      Mental Status: He is alert and oriented to person, place, and time.   Psychiatric:         Behavior: Behavior normal.         Thought Content: Thought content normal.         Judgment: Judgment normal.        Result Review :                Assessment and Plan "   Diagnoses and all orders for this visit:    1. Hypogonadism in male (Primary)  -     Prolactin  -     Luteinizing hormone  -     Follicle stimulating hormone  -     Ambulatory Referral to Endocrinology    2. Class 1 obesity due to excess calories without serious comorbidity with body mass index (BMI) of 31.0 to 31.9 in adult  -     topiramate (Topamax) 25 MG tablet; Take 1 tablet by mouth 2 (Two) Times a Day.  Dispense: 60 tablet; Refill: 2  -     phentermine 15 MG capsule; Take 1 capsule by mouth Every Morning.  Dispense: 30 capsule; Refill: 2  -     Ambulatory Referral to Endocrinology    3. Need for hepatitis vaccination  -     Fluzone >6mos ([id number])    Pleasant 49-year-old male here to discuss a few concerns.  Patient with new findings of hypogonadism, he did see an endocrinologist last year and had normal pituitary hormones at the lower end of normal, I do agree with rechecking them today, he would prefer to see an endocrinologist referral above.    We discussed goals to lose weight first to see if this will help improve his testosterone levels before initiating pharmacotherapy.  As our goal be to maintain muscle mass I think avoiding a GLP-1 will be helpful at first, start with Topamax and phentermine, discussed increasing protein intake weight lifting and following up with Endo.    If it does become evident that there is a pituitary issue I am happy to order an MRI of the brain.    In terms of follow-up, we will plan to refer to Endo, continue with annual follow-up with me.  If there is any issue with getting in with endocrinology to see him in 3 months with labs prior       Follow Up   Return in about 3 months (around 1/4/2025), or if symptoms worsen or fail to improve, for Recheck hypogonadism with labs prior if not established with Endo.  Patient was given instructions and counseling regarding his condition or for health maintenance advice. Please see specific information pulled into the AVS if  appropriate.

## 2024-10-05 LAB
FSH SERPL-ACNC: 3.3 MIU/ML (ref 1.5–12.4)
LH SERPL-ACNC: 2 MIU/ML (ref 1.7–8.6)
PROLACTIN SERPL-MCNC: 10.3 NG/ML (ref 3.9–22.7)

## 2024-10-20 ENCOUNTER — PATIENT MESSAGE (OUTPATIENT)
Dept: FAMILY MEDICINE CLINIC | Facility: CLINIC | Age: 49
End: 2024-10-20
Payer: COMMERCIAL

## 2024-10-25 RX ORDER — ALBENDAZOLE 200 MG/1
400 TABLET, FILM COATED ORAL 2 TIMES DAILY
Qty: 2 TABLET | Refills: 1 | Status: SHIPPED | OUTPATIENT
Start: 2024-10-25

## 2024-10-29 ENCOUNTER — LAB (OUTPATIENT)
Facility: HOSPITAL | Age: 49
End: 2024-10-29
Payer: COMMERCIAL

## 2024-10-29 ENCOUNTER — OFFICE VISIT (OUTPATIENT)
Dept: ENDOCRINOLOGY | Age: 49
End: 2024-10-29
Payer: COMMERCIAL

## 2024-10-29 VITALS
SYSTOLIC BLOOD PRESSURE: 120 MMHG | HEART RATE: 80 BPM | HEIGHT: 67 IN | DIASTOLIC BLOOD PRESSURE: 76 MMHG | BODY MASS INDEX: 31.55 KG/M2 | OXYGEN SATURATION: 97 % | WEIGHT: 201 LBS

## 2024-10-29 DIAGNOSIS — R79.89 LOW TESTOSTERONE IN MALE: Primary | ICD-10-CM

## 2024-10-29 RX ORDER — ALBENDAZOLE 200 MG/1
400 TABLET, FILM COATED ORAL 2 TIMES DAILY
Qty: 2 TABLET | Refills: 1 | Status: SHIPPED | OUTPATIENT
Start: 2024-10-29 | End: 2024-10-29

## 2024-10-29 NOTE — ASSESSMENT & PLAN NOTE
Asymptomatic, denies erectile dysfunction  Will do the comprehensive workup to rule out hypogonadism  Further testing and management based on results

## 2024-10-29 NOTE — PROGRESS NOTES
"Chief Complaint/ reason for consult:    Hypogonadism in male      History of Present Illness    Referred for further management of hypogonadism    Low testosterone 2 years ago   Tried DHT, discontinued in April 2024  Denies erectile dysfunction   One Kid 16 years old  Denies breast enlargement or breast discharge  Reports headache when reading a book   Denies history of blood clots  Denies personal or  family history of prostate cancer  Has sleep apnea, using CPAP, not every night   Does not smoke  No steroid or pain meds   Reports hair loss  Father has diabetes     Component      Latest Ref Rng 9/5/2024 9/16/2024 10/4/2024   Testosterone, Total      264.0 - 916.0 ng/dL  228.7 (L)     Testosterone, Free      6.8 - 21.5 pg/mL  6.7 (L)     TSH Baseline      0.270 - 4.200 uIU/mL 2.720      Prolactin      3.9 - 22.7 ng/mL   10.3    LH      1.7 - 8.6 mIU/mL   2.0    FSH      1.5 - 12.4 mIU/mL   3.3       Legend:  (L) Low      Objective   Vital Signs:  /76 (BP Location: Right arm, Patient Position: Sitting)   Pulse 80   Ht 170.2 cm (67.01\")   Wt 91.2 kg (201 lb)   SpO2 97%   BMI 31.47 kg/m²   Estimated body mass index is 31.47 kg/m² as calculated from the following:    Height as of this encounter: 170.2 cm (67.01\").    Weight as of this encounter: 91.2 kg (201 lb).                   Physical Exam  Constitutional:       Appearance: He is obese.   Cardiovascular:      Rate and Rhythm: Normal rate and regular rhythm.   Pulmonary:      Effort: Pulmonary effort is normal.      Breath sounds: Normal breath sounds. No wheezing.   Abdominal:      Palpations: Abdomen is soft.      Tenderness: There is no abdominal tenderness.   Musculoskeletal:         General: No swelling.      Cervical back: Neck supple. No tenderness.   Neurological:      Mental Status: He is alert and oriented to person, place, and time.   Psychiatric:         Mood and Affect: Mood normal.        Result Review :  The following data was reviewed by: " "Mahrokh Nokhbehzaeim, MD on 10/29/2024:  CMP          9/5/2024    14:08   CMP   Glucose 92    BUN 15    Creatinine 0.71    Sodium 139    Potassium 3.9    Chloride 104    Calcium 9.2    Total Protein 7.0    Albumin 4.4    Globulin 2.6    Total Bilirubin 0.4    Alkaline Phosphatase 105    AST (SGOT) 20    ALT (SGPT) 28    BUN/Creatinine Ratio 21.1      TSH          9/5/2024    14:08   TSH   TSH 2.720       No results found for: \"FREET4\"   Testosterone, Total   Date Value Ref Range Status   09/16/2024 228.7 (L) 264.0 - 916.0 ng/dL Final     Comment:     This [facility] LC/MS-MS method is currently certified by the CDC  Hormone Standardization Program (HoSt). Adult male reference  interval is based on a population of healthy nonobese males  (BMI <30) between 19 and 39 years old. Amrit et.al. JCEM  2017,102;3163-3896. PMID: 20611943.       Testosterone, Free   Date Value Ref Range Status   09/16/2024 6.7 (L) 6.8 - 21.5 pg/mL Final                   Assessment and Plan   Diagnoses and all orders for this visit:    1. Low testosterone in male (Primary)  Assessment & Plan:  Asymptomatic, denies erectile dysfunction  Will do the comprehensive workup to rule out hypogonadism  Further testing and management based on results    Orders:  -     Comprehensive Metabolic Panel  -     Estradiol  -     Testosterone  -     Sex Horm Binding Globulin  -     FSH & LH             Follow Up   Return in about 6 months (around 4/29/2025).  Patient was given instructions and counseling regarding his condition or for health maintenance advice. Please see specific information pulled into the AVS if appropriate.       "

## 2024-10-30 ENCOUNTER — LAB (OUTPATIENT)
Facility: HOSPITAL | Age: 49
End: 2024-10-30
Payer: COMMERCIAL

## 2024-10-30 LAB
ALBUMIN SERPL-MCNC: 4.3 G/DL (ref 3.5–5.2)
ALBUMIN/GLOB SERPL: 1.3 G/DL
ALP SERPL-CCNC: 104 U/L (ref 39–117)
ALT SERPL W P-5'-P-CCNC: 27 U/L (ref 1–41)
ANION GAP SERPL CALCULATED.3IONS-SCNC: 10.7 MMOL/L (ref 5–15)
AST SERPL-CCNC: 21 U/L (ref 1–40)
BILIRUB SERPL-MCNC: 0.6 MG/DL (ref 0–1.2)
BUN SERPL-MCNC: 15 MG/DL (ref 6–20)
BUN/CREAT SERPL: 14.7 (ref 7–25)
CALCIUM SPEC-SCNC: 9 MG/DL (ref 8.6–10.5)
CHLORIDE SERPL-SCNC: 106 MMOL/L (ref 98–107)
CO2 SERPL-SCNC: 22.3 MMOL/L (ref 22–29)
CREAT SERPL-MCNC: 1.02 MG/DL (ref 0.76–1.27)
EGFRCR SERPLBLD CKD-EPI 2021: 90.7 ML/MIN/1.73
ESTRADIOL SERPL HS-MCNC: 24.4 PG/ML
FSH SERPL-ACNC: 2.79 MIU/ML
GLOBULIN UR ELPH-MCNC: 3.2 GM/DL
GLUCOSE SERPL-MCNC: 103 MG/DL (ref 65–99)
LH SERPL-ACNC: 2.66 MIU/ML
POTASSIUM SERPL-SCNC: 3.8 MMOL/L (ref 3.5–5.2)
PROT SERPL-MCNC: 7.5 G/DL (ref 6–8.5)
SODIUM SERPL-SCNC: 139 MMOL/L (ref 136–145)
TESTOST SERPL-MCNC: 343 NG/DL (ref 249–836)

## 2024-10-30 PROCEDURE — 82670 ASSAY OF TOTAL ESTRADIOL: CPT | Performed by: STUDENT IN AN ORGANIZED HEALTH CARE EDUCATION/TRAINING PROGRAM

## 2024-10-30 PROCEDURE — 83002 ASSAY OF GONADOTROPIN (LH): CPT | Performed by: STUDENT IN AN ORGANIZED HEALTH CARE EDUCATION/TRAINING PROGRAM

## 2024-10-30 PROCEDURE — 83001 ASSAY OF GONADOTROPIN (FSH): CPT | Performed by: STUDENT IN AN ORGANIZED HEALTH CARE EDUCATION/TRAINING PROGRAM

## 2024-10-30 PROCEDURE — 84270 ASSAY OF SEX HORMONE GLOBUL: CPT | Performed by: STUDENT IN AN ORGANIZED HEALTH CARE EDUCATION/TRAINING PROGRAM

## 2024-10-30 PROCEDURE — 84403 ASSAY OF TOTAL TESTOSTERONE: CPT | Performed by: STUDENT IN AN ORGANIZED HEALTH CARE EDUCATION/TRAINING PROGRAM

## 2024-10-30 PROCEDURE — 36415 COLL VENOUS BLD VENIPUNCTURE: CPT | Performed by: STUDENT IN AN ORGANIZED HEALTH CARE EDUCATION/TRAINING PROGRAM

## 2024-10-30 PROCEDURE — 80053 COMPREHEN METABOLIC PANEL: CPT | Performed by: STUDENT IN AN ORGANIZED HEALTH CARE EDUCATION/TRAINING PROGRAM

## 2024-10-31 ENCOUNTER — TELEPHONE (OUTPATIENT)
Dept: ENDOCRINOLOGY | Age: 49
End: 2024-10-31
Payer: COMMERCIAL

## 2024-10-31 LAB — SHBG SERPL-SCNC: 18.3 NMOL/L (ref 16.5–55.9)

## 2024-10-31 NOTE — TELEPHONE ENCOUNTER
Called to discuss the results, no answer  Voicemail was not set up  Total testosterone and calculated free testosterone within the normal range calculated free testosterone 9.22  No further testing and follow-up is required

## 2024-11-16 ENCOUNTER — TELEPHONE (OUTPATIENT)
Dept: URGENT CARE | Facility: CLINIC | Age: 49
End: 2024-11-16
Payer: COMMERCIAL

## 2025-03-19 ENCOUNTER — OFFICE VISIT (OUTPATIENT)
Facility: HOSPITAL | Age: 50
End: 2025-03-19
Payer: COMMERCIAL

## 2025-03-19 VITALS — HEART RATE: 62 BPM | HEIGHT: 69 IN | OXYGEN SATURATION: 97 % | BODY MASS INDEX: 29.38 KG/M2 | WEIGHT: 198.4 LBS

## 2025-03-19 DIAGNOSIS — E66.811 CLASS 1 OBESITY DUE TO EXCESS CALORIES WITHOUT SERIOUS COMORBIDITY WITH BODY MASS INDEX (BMI) OF 31.0 TO 31.9 IN ADULT: ICD-10-CM

## 2025-03-19 DIAGNOSIS — R03.0 ELEVATED BLOOD PRESSURE READING IN OFFICE WITHOUT DIAGNOSIS OF HYPERTENSION: Primary | ICD-10-CM

## 2025-03-19 DIAGNOSIS — E78.2 ELEVATED CHOLESTEROL WITH ELEVATED TRIGLYCERIDES: ICD-10-CM

## 2025-03-19 DIAGNOSIS — E66.09 CLASS 1 OBESITY DUE TO EXCESS CALORIES WITHOUT SERIOUS COMORBIDITY WITH BODY MASS INDEX (BMI) OF 31.0 TO 31.9 IN ADULT: ICD-10-CM

## 2025-03-19 DIAGNOSIS — G47.33 OSA ON CPAP: ICD-10-CM

## 2025-03-19 PROCEDURE — G0463 HOSPITAL OUTPT CLINIC VISIT: HCPCS

## 2025-03-19 NOTE — PROGRESS NOTES
"Follow Up [facility] Disorders [facility] Note       Primary Care Physician: Julissa Krishnamurthy MD    Interval History:   The patient is a 49 y.o. male      History of Present Illness  The patient presents for evaluation of sleep apnea.    He reports a general sense of well-being, attributing this to his daily exercise regimen, which he believes has been beneficial. He acknowledges that he does not consistently utilize his CPAP machine due to discomfort, particularly when he changes positions during sleep. Despite this, he notes an improvement in his fatigue levels. He has previously used a dental appliance to alleviate snoring but has since discontinued its use. He expresses a belief that his condition could further improve. His current CPAP setup includes a full mask.    SOCIAL HISTORY  The patient works for [employer].         Downloaded PAP Data Evaluated For Therapeutic Response and Compliance:  DME is [facility].  Downloads between 2/15/2025 and 3/16/2025.  Average usage is 5 hours and 30 minutes.  Average AHI is 1.9.  PAP pressure is 6.3 CWP.    The patient uses a fullface mask interface.    Review of Systems:    A complete review of systems was done and all were negative with the exception of none    Social History:    Social History     Socioeconomic History    Marital status:    Tobacco Use    Smoking status: Never    Smokeless tobacco: Never   Vaping Use    Vaping status: Never Used   Substance and Sexual Activity    Alcohol use: Yes     Alcohol/week: 2.0 standard drinks of alcohol     Types: 1 Glasses of wine, 1 Cans of beer per week     Comment: occ    Drug use: No    Sexual activity: Yes     Partners: Female     Birth control/protection: None     Comment:         Allergies:  Ketorolac     Medication Review:  Reviewed.      Vital Signs:    Vitals:    03/19/25 0827   Pulse: 62   SpO2: 97%   Weight: 90 kg (198 lb 6.4 oz)   Height: 175.3 cm (69\")     Body mass index is 29.3 " kg/m².  .BMIFOLLOWUP    Physical Exam:    Constitutional:  Well developed 49 y.o. male that appears in no apparent distress.  Awake & oriented times 3.  Normal mood with normal recent and remote memory and normal judgement.  Eyes:  Conjunctivae normal.      Self-administered [place] Sleepiness Scale test results: 1  0-5 Lower normal daytime sleepiness  6-10 Higher normal daytime sleepiness  11-12 Mild, 13-15 Moderate, & 16-24 Severe excessive daytime sleepiness        I have reviewed the above results and compared them with the patient's last downloads and reviewed with the patient.    Impression:     Encounter Diagnoses   Name Primary?    Elevated blood pressure reading in office without diagnosis of hypertension Yes    Elevated cholesterol with elevated triglycerides     Class 1 obesity due to excess calories without serious comorbidity with body mass index (BMI) of 31.0 to 31.9 in adult     ROD on CPAP          Assessment & Plan  1. Sleep Apnea.  His sleep apnea is mild, except when he is in a supine position, where it becomes moderately severe. His current usage of the CPAP machine is only 37%, which may not satisfy insurance requirements. When he uses the CPAP machine, his sleep apnea index is low at 2. He was advised to use the CPAP machine for more than 4 hours per night, at least 7 out of 10 nights, to meet insurance criteria and avoid having to return the machine. He was also encouraged to consider trying different styles of masks to improve comfort and compliance.    Follow-up  The patient will follow up in 1 month.         Good sleep hygiene measures should be maintained.  Weight loss would be beneficial in this patient who is overweight by Body mass index is 29.3 kg/m².      After evaluating the patient and assessing results available, the patient is benefiting from the treatment being provided.     The patient will continue CPAP but increase his compliance.  Potential side effects of PAP therapy  reviewed and addressed as needed.  After clinical evaluation and review of downloads, I recommend no changes to the patient's pressures.      I answered all of the patient's questions.  The patient will call the [facility] Center for any problems and will follow up 1 month.    Patient or patient representative verbalized consent for the use of Ambient Listening during the visit with  Dinh Villa MD for chart documentation. 3/19/2025  08:39 EDT           Dinh Villa MD  Sleep Medicine  03/19/25  08:39 EDT

## 2025-07-11 ENCOUNTER — HOSPITAL ENCOUNTER (EMERGENCY)
Facility: HOSPITAL | Age: 50
Discharge: HOME | End: 2025-07-11
Attending: EMERGENCY MEDICINE
Payer: COMMERCIAL

## 2025-07-11 VITALS
HEART RATE: 76 BPM | TEMPERATURE: 98.5 F | HEIGHT: 67 IN | OXYGEN SATURATION: 99 % | BODY MASS INDEX: 31.08 KG/M2 | RESPIRATION RATE: 16 BRPM | DIASTOLIC BLOOD PRESSURE: 88 MMHG | SYSTOLIC BLOOD PRESSURE: 113 MMHG | WEIGHT: 198 LBS

## 2025-07-11 DIAGNOSIS — R19.7 DIARRHEA, UNSPECIFIED TYPE: ICD-10-CM

## 2025-07-11 DIAGNOSIS — R10.9 ABDOMINAL PAIN, UNSPECIFIED ABDOMINAL LOCATION: Primary | ICD-10-CM

## 2025-07-11 LAB
ALBUMIN SERPL-MCNC: 4.4 G/DL (ref 3.5–5.2)
ALBUMIN/GLOB SERPL: 1.7 G/DL
ALP SERPL-CCNC: 94 U/L (ref 39–117)
ALT SERPL W P-5'-P-CCNC: 40 U/L (ref 1–41)
ANION GAP SERPL CALCULATED.3IONS-SCNC: 8.6 MMOL/L (ref 5–15)
AST SERPL-CCNC: 29 U/L (ref 1–40)
BASOPHILS # BLD AUTO: 0.02 10*3/MM3 (ref 0–0.2)
BASOPHILS NFR BLD AUTO: 0.5 % (ref 0–1.5)
BILIRUB SERPL-MCNC: 0.9 MG/DL (ref 0–1.2)
BILIRUB UR QL STRIP: NEGATIVE
BUN SERPL-MCNC: 10 MG/DL (ref 6–20)
BUN/CREAT SERPL: 12.5 (ref 7–25)
CALCIUM SPEC-SCNC: 9.1 MG/DL (ref 8.6–10.5)
CHLORIDE SERPL-SCNC: 103 MMOL/L (ref 98–107)
CLARITY UR: CLEAR
CO2 SERPL-SCNC: 24.4 MMOL/L (ref 22–29)
COLOR UR: YELLOW
CREAT SERPL-MCNC: 0.8 MG/DL (ref 0.76–1.27)
DEPRECATED RDW RBC AUTO: 42.9 FL (ref 37–54)
EGFRCR SERPLBLD CKD-EPI 2021: 108.5 ML/MIN/1.73
EOSINOPHIL # BLD AUTO: 0.11 10*3/MM3 (ref 0–0.4)
EOSINOPHIL NFR BLD AUTO: 2.5 % (ref 0.3–6.2)
ERYTHROCYTE [DISTWIDTH] IN BLOOD BY AUTOMATED COUNT: 12.6 % (ref 12.3–15.4)
GLOBULIN UR ELPH-MCNC: 2.6 GM/DL
GLUCOSE SERPL-MCNC: 96 MG/DL (ref 65–99)
GLUCOSE UR STRIP-MCNC: NEGATIVE MG/DL
HCT VFR BLD AUTO: 44.8 % (ref 37.5–51)
HGB BLD-MCNC: 15.1 G/DL (ref 13–17.7)
HGB UR QL STRIP.AUTO: NEGATIVE
HOLD SPECIMEN: NORMAL
HOLD SPECIMEN: NORMAL
IMM GRANULOCYTES # BLD AUTO: 0.01 10*3/MM3 (ref 0–0.05)
IMM GRANULOCYTES NFR BLD AUTO: 0.2 % (ref 0–0.5)
KETONES UR QL STRIP: ABNORMAL
LEUKOCYTE ESTERASE UR QL STRIP.AUTO: NEGATIVE
LIPASE SERPL-CCNC: 37 U/L (ref 13–60)
LYMPHOCYTES # BLD AUTO: 0.97 10*3/MM3 (ref 0.7–3.1)
LYMPHOCYTES NFR BLD AUTO: 21.8 % (ref 19.6–45.3)
MCH RBC QN AUTO: 30.8 PG (ref 26.6–33)
MCHC RBC AUTO-ENTMCNC: 33.7 G/DL (ref 31.5–35.7)
MCV RBC AUTO: 91.2 FL (ref 79–97)
MONOCYTES # BLD AUTO: 0.43 10*3/MM3 (ref 0.1–0.9)
MONOCYTES NFR BLD AUTO: 9.7 % (ref 5–12)
NEUTROPHILS NFR BLD AUTO: 2.9 10*3/MM3 (ref 1.7–7)
NEUTROPHILS NFR BLD AUTO: 65.3 % (ref 42.7–76)
NITRITE UR QL STRIP: NEGATIVE
PH UR STRIP.AUTO: 6.5 [PH] (ref 5–8)
PLATELET # BLD AUTO: 162 10*3/MM3 (ref 140–450)
PMV BLD AUTO: 10.6 FL (ref 6–12)
POTASSIUM SERPL-SCNC: 3.8 MMOL/L (ref 3.5–5.2)
PROT SERPL-MCNC: 7 G/DL (ref 6–8.5)
PROT UR QL STRIP: NEGATIVE
RBC # BLD AUTO: 4.91 10*6/MM3 (ref 4.14–5.8)
SODIUM SERPL-SCNC: 136 MMOL/L (ref 136–145)
SP GR UR STRIP: 1.01 (ref 1–1.03)
UROBILINOGEN UR QL STRIP: ABNORMAL
WBC NRBC COR # BLD AUTO: 4.44 10*3/MM3 (ref 3.4–10.8)
WHOLE BLOOD HOLD COAG: NORMAL
WHOLE BLOOD HOLD SPECIMEN: NORMAL

## 2025-07-11 PROCEDURE — 99284 EMERGENCY DEPT VISIT MOD MDM: CPT | Performed by: EMERGENCY MEDICINE

## 2025-07-11 PROCEDURE — 80053 COMPREHEN METABOLIC PANEL: CPT

## 2025-07-11 PROCEDURE — 81003 URINALYSIS AUTO W/O SCOPE: CPT | Performed by: EMERGENCY MEDICINE

## 2025-07-11 PROCEDURE — 83690 ASSAY OF LIPASE: CPT

## 2025-07-11 PROCEDURE — 99283 EMERGENCY DEPT VISIT LOW MDM: CPT

## 2025-07-11 PROCEDURE — 85025 COMPLETE CBC W/AUTO DIFF WBC: CPT

## 2025-07-11 PROCEDURE — 96374 THER/PROPH/DIAG INJ IV PUSH: CPT

## 2025-07-11 RX ORDER — PANTOPRAZOLE SODIUM 40 MG/10ML
40 INJECTION, POWDER, LYOPHILIZED, FOR SOLUTION INTRAVENOUS ONCE
Status: COMPLETED | OUTPATIENT
Start: 2025-07-11 | End: 2025-07-11

## 2025-07-11 RX ORDER — DICYCLOMINE HCL 20 MG
20 TABLET ORAL EVERY 6 HOURS PRN
Qty: 16 TABLET | Refills: 0 | Status: SHIPPED | OUTPATIENT
Start: 2025-07-11 | End: 2025-07-15

## 2025-07-11 RX ORDER — SODIUM CHLORIDE 0.9 % (FLUSH) 0.9 %
10 SYRINGE (ML) INJECTION AS NEEDED
Status: DISCONTINUED | OUTPATIENT
Start: 2025-07-11 | End: 2025-07-11 | Stop reason: HOSPADM

## 2025-07-11 RX ADMIN — PANTOPRAZOLE SODIUM 40 MG: 40 INJECTION, POWDER, FOR SOLUTION INTRAVENOUS at 15:22

## 2025-07-11 NOTE — FSED PROVIDER NOTE
"Subjective   History of Present Illness  50-year-old male patient with chief complaint of diarrhea.  Patient states on Monday night he had several bouts of diarrhea.  Patient states he went to [place] urgent care on Tuesday morning.  Patient was diagnosed with viral gastroenteritis.  Patient was prescribed Zofran.  Patient states his symptoms improved on Wednesday but reoccurred last night.  Patient states he has had 4 bouts of diarrhea since last night.  Patient ate was able to tolerate banana and water this morning.  Denies nausea or vomiting at home.  Patient states diarrhea has improved since he was seen last on Tuesday morning.  When asked if patient has abdominal pain patient states \"a little bit\".        Review of Systems   Gastrointestinal:  Positive for abdominal pain and diarrhea.   All other systems reviewed and are negative.      Past Medical History:   Diagnosis Date    Allergic 2005    Red eye caused by allergies to grass    Dermatitis     Hyperlipidemia     Testosterone deficiency 2023    Vitamin D deficiency 2022       Allergies   Allergen Reactions    Ketorolac GI Intolerance       History reviewed. No pertinent surgical history.    Family History   Problem Relation Age of Onset    No Known Problems Mother     Hypertension Father         Exercise     Diabetes Father     Breast cancer Sister     Cancer Sister         Breast Cancer    No Known Problems Sister     No Known Problems Brother     Cancer Paternal Grandmother     Heart attack Neg Hx     Stroke Neg Hx        Social History     Socioeconomic History    Marital status:    Tobacco Use    Smoking status: Never     Passive exposure: Never    Smokeless tobacco: Never   Vaping Use    Vaping status: Never Used   Substance and Sexual Activity    Alcohol use: Yes     Alcohol/week: 2.0 standard drinks of alcohol     Types: 1 Glasses of wine, 1 Cans of beer per week     Comment: occ    Drug use: No    Sexual activity: Yes     Partners: Female     " "Birth control/protection: None     Comment:             Objective   Physical Exam  Vitals and nursing note reviewed.   Constitutional:       General: He is not in acute distress.     Appearance: Normal appearance.   HENT:      Head: Normocephalic and atraumatic.      Right Ear: Tympanic membrane normal.      Left Ear: Tympanic membrane normal.      Mouth/Throat:      Mouth: Mucous membranes are moist.   Eyes:      Extraocular Movements: Extraocular movements intact.      Conjunctiva/sclera: Conjunctivae normal.   Cardiovascular:      Rate and Rhythm: Normal rate.      Pulses: Normal pulses.      Heart sounds: Normal heart sounds.   Pulmonary:      Effort: Pulmonary effort is normal.      Breath sounds: Normal breath sounds.   Abdominal:      Palpations: Abdomen is soft.      Tenderness: There is no abdominal tenderness. There is no guarding or rebound.   Musculoskeletal:         General: Normal range of motion.      Cervical back: Normal range of motion.   Skin:     General: Skin is warm.   Neurological:      General: No focal deficit present.      Mental Status: He is alert.   Psychiatric:         Mood and Affect: Mood normal.         Procedures           ED Course                                           Medical Decision Making  Abdominal exam is benign.  Patient states his abdominal pain is  \"a little bit\".  On my examination no abdominal tenderness noted.  No rebound tenderness or guarding noted.  Patient is able to tolerate p.o. challenge at bedside.  Patient states his diarrhea has improved since Tuesday morning.  No emergent indication to obtain CT abdomen and pelvis at this time.  On reevaluation at 3:50 PM patient is resting comfortably at bedside.  Patient is nontoxic in appearance.  Patient is stable for discharge    Amount and/or Complexity of Data Reviewed  Labs: ordered.     Details: Labs Reviewed  URINALYSIS W/ CULTURE IF INDICATED - Abnormal; Notable for the following components:     Ketones, " UA                     (*)               All other components within normal limits         Narrative: In absence of clinical symptoms, the presence of pyuria, bacteria, and/or nitrites on the urinalysis result does not correlate with infection.                  Urine microscopic not indicated.  LIPASE - Normal  CBC WITH AUTO DIFFERENTIAL - Normal  RAINBOW DRAW         Narrative: The following orders were created for panel order [place] Draw.                  Procedure                               Abnormality         Status                                     ---------                               -----------         ------                                     Green Top (Gel)[[id number]]                                  Final result                               Lavender Top[[id number]]                                     Final result                               Gold Top - SST[[id number]]                                   Final result                               Light Blue Top[[id number]]                                   Final result                               Green Top (Gel)[[id number]]                                                                                               Please view results for these tests on the individual orders.  COMPREHENSIVE METABOLIC PANEL         Narrative: GFR Categories in Chronic Kidney Disease (CKD)                                              GFR Category          GFR (mL/min/1.73)    Interpretation                  G1                    90 or greater        Normal or high (1)                  G2                    60-89                Mild decrease (1)                  G3a                   45-59                Mild to moderate decrease                  G3b                   30-44                Moderate to severe decrease                  G4                    15-29                Severe decrease                  G5                    14 or less           Kidney failure                                     (1)In the absence of evidence of kidney disease, neither GFR category G1 or G2 fulfill the criteria for CKD.                                    eGFR calculation 2021 CKD-EPI creatinine equation, which does not include race as a factor  CBC AND DIFFERENTIAL         Narrative: The following orders were created for panel order CBC & Differential.                  Procedure                               Abnormality         Status                                     ---------                               -----------         ------                                     CBC Auto Differential[[id number]]        Normal              Final result                                                 Please view results for these tests on the individual orders.  GREEN TOP  LAVENDER TOP  GOLD TOP - SST  LIGHT BLUE TOP      Risk  Prescription drug management.        Final diagnoses:   Abdominal pain, unspecified abdominal location   Diarrhea, unspecified type       ED Disposition  ED Disposition       ED Disposition   Discharge    Condition   Stable    Comment   --               Julissa Krishnamurthy MD  [address] [id number] [phone]    Call in 3 days  Follow-up with your primary care doctor as soon as possible.  Return for any worsening conditions.         Medication List        New Prescriptions      dicyclomine 20 MG tablet  Commonly known as: BENTYL  Take 1 tablet by mouth Every 6 (Six) Hours As Needed for Abdominal Cramping for up to 4 days.               Where to Get Your Medications        [facility] medications [id number] - [place], [place] - [address] AT [address] [facility] & [place] - [phone]  - [phone]   [address] [facility] [address]      Phone: [phone]   dicyclomine 20 MG tablet          Skin intact and not indurated